# Patient Record
Sex: MALE | Race: WHITE | Employment: OTHER | URBAN - METROPOLITAN AREA
[De-identification: names, ages, dates, MRNs, and addresses within clinical notes are randomized per-mention and may not be internally consistent; named-entity substitution may affect disease eponyms.]

---

## 2017-04-20 ENCOUNTER — TELEPHONE (OUTPATIENT)
Dept: CARDIOLOGY CLINIC | Age: 72
End: 2017-04-20

## 2017-07-17 ENCOUNTER — TELEPHONE (OUTPATIENT)
Dept: CARDIOLOGY CLINIC | Age: 72
End: 2017-07-17

## 2017-07-17 NOTE — TELEPHONE ENCOUNTER
[facility] obtaining the last office note for patient, as he is having cataract and glaucoma surgery on 7/27/17. I called and spoke with Ms. Shona Benavides this morning and informed her that we will need a signed records release sent over before we can release anything.   Her contact #s are:  Coty  [phone]             Fax  [phone]

## 2017-10-25 ENCOUNTER — OFFICE VISIT (OUTPATIENT)
Dept: CARDIOLOGY CLINIC | Age: 72
End: 2017-10-25

## 2017-10-25 VITALS
WEIGHT: 188 LBS | HEIGHT: 67 IN | DIASTOLIC BLOOD PRESSURE: 80 MMHG | OXYGEN SATURATION: 97 % | SYSTOLIC BLOOD PRESSURE: 110 MMHG | HEART RATE: 44 BPM | BODY MASS INDEX: 29.51 KG/M2

## 2017-10-25 DIAGNOSIS — E78.00 HYPERCHOLESTEROLEMIA: ICD-10-CM

## 2017-10-25 DIAGNOSIS — E11.9 TYPE 2 DIABETES MELLITUS WITHOUT COMPLICATION, WITHOUT LONG-TERM CURRENT USE OF INSULIN (HCC): ICD-10-CM

## 2017-10-25 DIAGNOSIS — R00.1 BRADYCARDIA: Primary | ICD-10-CM

## 2017-10-25 DIAGNOSIS — I82.509 CHRONIC DEEP VEIN THROMBOSIS (DVT) OF LOWER EXTREMITY, UNSPECIFIED LATERALITY, UNSPECIFIED VEIN (HCC): ICD-10-CM

## 2017-10-25 DIAGNOSIS — R07.89 ATYPICAL CHEST PAIN: ICD-10-CM

## 2017-10-25 NOTE — PROGRESS NOTES
1. Have you been to the ER, urgent care clinic since your last visit? Hospitalized since your last visit? No     2. Have you seen or consulted any other health care providers outside of the [address] [place] [address] since your last visit? Include any pap smears or colon screening.  No

## 2017-10-25 NOTE — MR AVS SNAPSHOT
Visit Information Date & Time Provider Department Dept. Phone Encounter #  
 10/25/2017  1:20 PM Bard Raad MD [facility] [id number]  
  
 3/29/2018  1:00 PM  
Any with Sudhir Phillips MD  
[facility] ([facility] Appt Note: 1 year fu  
 [address] 
[address] [facility] [address]  
[address] [facility] [address] Upcoming Health Maintenance Date Due HEMOGLOBIN A1C Q6M 1945 LIPID PANEL Q1 1944 FOOT EXAM Q1 5/18/1954 MICROALBUMIN Q1 5/18/1954 EYE EXAM RETINAL OR DILATED Q1 5/18/1954 DTaP/Tdap/Td series (1 - Tdap) 5/18/1965 FOBT Q 1 YEAR AGE 50-75 5/18/1994 ZOSTER VACCINE AGE 60> 3/18/2004 GLAUCOMA SCREENING Q2Y 5/18/2009 Pneumococcal 65+ Low/Medium Risk (1 of 2 - PCV13) 5/18/2009 MEDICARE YEARLY EXAM 5/18/2009 INFLUENZA AGE 9 TO ADULT 8/1/2017 Allergies as of 10/25/2017  Review Complete On: 3/31/2017 By: Sudhir Phillips MD  
 No Known Allergies Current Immunizations  Never Reviewed No immunizations on file. Not reviewed this visit You Were Diagnosed With   
  
 Codes Comments Chronic deep vein thrombosis (DVT) of lower extremity, unspecified laterality, unspecified vein (HCC)    -  Primary ICD-10-CM: R54.643 ICD-9-CM: 453.50 Hypercholesterolemia     ICD-10-CM: E78.00 ICD-9-CM: 272.0 Dyslipidemia     ICD-10-CM: E78.5 ICD-9-CM: 272.4 Vitals BP Pulse Height(growth percentile) Weight(growth percentile) SpO2 BMI  
 110/80 (!) 44 5' 7\" (1.702 m) 188 lb (85.3 kg) 97% 29.44 kg/m2 Smoking Status Never Smoker Vitals History BMI and BSA Data Body Mass Index Body Surface Area  
 29.44 kg/m 2 2.01 m 2 Preferred Pharmacy Pharmacy Name Phone [facility] [address] [facility] [phone] Your Updated Medication List  
  
   
This list is accurate as of: 10/25/17  2:15 PM.  Always use your most recent med list.  
  
  
  
  
 acetaminophen 500 mg tablet Commonly known as:  TYLENOL Take 1,000 mg by mouth every six (6) hours as needed for Pain. ELIQUIS 5 mg tablet Generic drug:  apixaban Take 5 mg by mouth two (2) times a day. FLOMAX 0.4 mg capsule Generic drug:  tamsulosin Take 0.4 mg by mouth daily. fluticasone 50 mcg/actuation nasal spray Commonly known as:  FLONASE  
1 [place] by Both Nostrils route daily. JANUVIA PO Take 100 mg by mouth daily. mirabegron ER 50 mg ER tablet Commonly known as:  MYRBETRIQ Take 1 Tab by mouth daily. MULTIVITAMIN PO Take 1 Tab by mouth daily. PRAVACHOL 20 mg tablet Generic drug:  pravastatin Take 20 mg by mouth nightly. TYLENOL PM PO Take 500 mg by mouth as needed ( pain and sleep aid as needed). We Performed the Following AMB POC EKG ROUTINE W/ 12 LEADS, INTER & REP [90696 CPT(R)] Introducing [facility] [place] [facility]! Rey Rausch introduces [employer] patient portal. Now you can access parts of your medical record, email your doctor's office, and request medication refills online. 1. In your internet browser, go to [email or web address] [email or web address] 2. Click on the First Time User? Click Here link in the Sign In box. You will see the New Member Sign Up page. 3. Enter your [employer] Access Code exactly as it appears below. You will not need to use this code after youve completed the sign-up process. If you do not sign up before the expiration date, you must request a new code. · [employer] Access Code: [email or web address] Expires: 1/23/2018  1:20 PM 
 
4. Enter the last four digits of your Social Security Number (xxxx) and Date of Birth (mm/dd/yyyy) as indicated and click Submit. You will be taken to the next sign-up page. 5. Create a [employer] ID. This will be your [employer] login ID and cannot be changed, so think of one that is secure and easy to remember. 6. Create a [employer] password. You can change your password at any time. 7. Enter your Password Reset Question and Answer. This can be used at a later time if you forget your password. 8. Enter your e-mail address. You will receive e-mail notification when new information is available in [address] 9. Click Sign Up. You can now view and download portions of your medical record. 10. Click the Download Summary menu link to download a portable copy of your medical information. If you have questions, please visit the Frequently Asked Questions section of the [employer] website. Remember, [employer] is NOT to be used for urgent needs. For medical emergencies, dial 911. Now available from your iPhone and Android! Please provide this summary of care documentation to your next provider. Your primary care clinician is listed as Shon Mohs. If you have any questions after today's visit, please call [phone].

## 2017-10-25 NOTE — PROGRESS NOTES
HISTORY OF PRESENT ILLNESS  Cortez Mendes is a 67 y.o. male. Slow Heart Rate   Associated symptoms include chest pain. Pertinent negatives include no abdominal pain, no headaches and no shortness of breath. Chest Pain (Angina)    Pertinent negatives include no abdominal pain, no claudication, no cough, no dizziness, no fever, no headaches, no nausea, no orthopnea, no palpitations, no PND, no shortness of breath and no vomiting. Patient presents for a scheduled followup visit. He has a past medical history significant for asymptomatic bradycardia with heart rates in the upper 30s to low 50s. He also has a history of a previous CVA with residual right-sided deficit, history of recurrent DVTs, status post IVC filter, on chronic oral anticoagulation therapy. He last underwent an echocardiogram in February 2011 which showed normal LV size and systolic function. Grade 1 diastolic dysfunction, no significant valvular heart disease. He also underwent a 24-hour Holter monitor which did not show any prolonged pauses or episodes of high-grade AV block. Patient remains on Eliquis for oral anticoagulation. He was last seen in the office a little over a year ago. Since last visit he describes a total of 2 episodes of left-sided chest discomfort both which have occurred at rest.  The first episode was about 3-4 months ago which occurred intermittently for several hours and then resolved. He had a second episode which occurred yesterday lasting approximately 30 minutes before subsiding. It occurred at rest while in bed last night and was not particularly worse with exertion. No associated shortness of breath, diaphoresis, nausea or vomiting. The patient recalls that he was out in the yard raking leaves yesterday and recalls doing some yard work prior to but not during his previous episode as well.     Past Medical History:   Diagnosis Date    Abnormal electrocardiogram     BPH (benign prostatic hyperplasia)     Bradycardia     Asymptomatic    Cardiac echocardiogram 02/14/2011    EF 60-65%. No RWMA. Gr 1 DDfx. Mild LAE. Atrial septal aneurysm. No R-L shunt. Mild MARLON.  Cardiac Holter monitor study 02/21/2011    Sinus rhythm, avg HR 48 bpm (range  bpm). Occasional single VE (bigeminy noted). Occasional SVT (single, pairs, multifocal). Brief irregular runs of SVT suggest A-fib.  Cardiac nuclear imaging test 06/02/2008    [facility], [place], [address]:  No perfusion abnormalities. EF 49%. Neg EKG on pharm stress test.      Cardiovascular LE arterial testing 12/21/2009    No evidence of disease gisel. 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 90 Tab 3    acetaminophen (TYLENOL) 500 mg tablet Take 1,000 mg by mouth every six (6) hours as needed for Pain.  fluticasone (FLONASE) 50 mcg/actuation nasal spray 1 [place] by Both Nostrils route daily.  ACETAMINOPHEN/DIPHENHYDRAMINE (TYLENOL PM PO) Take 500 mg by mouth as needed ( pain and sleep aid as needed).  apixaban (ELIQUIS) 5 mg tablet Take 5 mg by mouth two (2) times a day.  SITAGLIPTIN PHOSPHATE (JANUVIA PO) Take 100 mg by mouth daily.  pravastatin (PRAVACHOL) 20 mg tablet Take 20 mg by mouth nightly.  tamsulosin (FLOMAX) 0.4 mg capsule Take 0.4 mg by mouth daily.  MULTIVITAMIN PO Take 1 Tab by mouth daily.          No Known Allergies     Social History   Substance Use Topics    Smoking status: Never Smoker    Smokeless tobacco: Never Used    Alcohol use No      Comment: occasionally         Review of Systems   Constitutional: Negative for chills, fever and weight loss. HENT: Negative for nosebleeds. Eyes: Negative for blurred vision and double vision. Respiratory: Negative for cough, shortness of breath and wheezing. Cardiovascular: Positive for chest pain and leg swelling. Negative for palpitations, orthopnea, claudication and PND. Gastrointestinal: Negative for abdominal pain, heartburn, nausea and vomiting. Genitourinary: Negative for dysuria and hematuria. Musculoskeletal: Negative for falls and myalgias. Skin: Negative for rash. Neurological: Negative for dizziness, focal weakness and headaches. Endo/Heme/Allergies: Does not bruise/bleed easily. Psychiatric/Behavioral: Negative for substance abuse. Visit Vitals    /80    Pulse (!) 44    Ht 5' 7\" (1.702 m)    Wt 85.3 kg (188 lb)    SpO2 97%    BMI 29.44 kg/m2      Physical Exam   Constitutional: He is oriented to person, place, and time. He appears well-developed and well-nourished. HENT:   Head: Normocephalic and atraumatic. Eyes: Conjunctivae are normal.   Neck: Neck supple. No JVD present. Carotid bruit is not present. Cardiovascular: Regular rhythm, S1 normal, S2 normal and normal pulses. Bradycardia present. Exam reveals no gallop and no S3. No murmur heard. Pulmonary/Chest: Breath sounds normal. He has no wheezes. He has no rales. Abdominal: Soft. Bowel sounds are normal. There is no tenderness. Musculoskeletal: He exhibits edema (Left lower extremity). Neurological: He is alert and oriented to person, place, and time. Skin: Skin is warm and dry. EKG: Sinus bradycardia, left anterior fascicular block, nonspecific IVCD, Poor R-wave progression, no ST or T wave abnormalities. Compared to the previous EKG, no significant interval change    ASSESSMENT and PLAN    Asymptomatic bradycardia. The patient continues to have a low resting heart rate in the 40s to 50s. He still appears relatively asymptomatic.   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 Atypical chest pain. I suspect this is musculoskeletal in nature as it was somewhat reproducible on exam today. No further cardiac workup needed unless this becomes more frequent and has an exertional component. Dyslipidemia. Patient is now managed with pravastatin 20 mg daily. This is been followed by his PCP. Diabetes mellitus, type II on oral agents also been managed by his primary care. Goal A1c should be less than 7. Recurrent DVTs, now on Eliquis for anticoagulation and does have a IVC filter in place. History of CVA, with residual right-sided deficit. He is now on a statin and Eliquis for secondary prevention. Followup annually, sooner if needed.

## 2019-02-06 ENCOUNTER — OFFICE VISIT (OUTPATIENT)
Dept: CARDIOLOGY CLINIC | Age: 74
End: 2019-02-06

## 2019-02-06 VITALS
DIASTOLIC BLOOD PRESSURE: 70 MMHG | OXYGEN SATURATION: 95 % | WEIGHT: 202 LBS | HEIGHT: 67 IN | BODY MASS INDEX: 31.71 KG/M2 | HEART RATE: 42 BPM | SYSTOLIC BLOOD PRESSURE: 122 MMHG

## 2019-02-06 DIAGNOSIS — R06.02 SOB (SHORTNESS OF BREATH): Primary | ICD-10-CM

## 2019-02-06 DIAGNOSIS — E78.00 HYPERCHOLESTEROLEMIA: ICD-10-CM

## 2019-02-06 DIAGNOSIS — R00.1 BRADYCARDIA: ICD-10-CM

## 2019-02-06 DIAGNOSIS — I82.509 CHRONIC DEEP VEIN THROMBOSIS (DVT) OF LOWER EXTREMITY, UNSPECIFIED LATERALITY, UNSPECIFIED VEIN (HCC): ICD-10-CM

## 2019-02-06 DIAGNOSIS — R94.31 ABNORMAL ELECTROCARDIOGRAM: ICD-10-CM

## 2019-02-06 NOTE — PROGRESS NOTES
HISTORY OF PRESENT ILLNESS Dante Walter is a 75 y.o. male. Slow Heart Rate Associated symptoms include shortness of breath. Follow-up Associated symptoms include shortness of breath. Shortness of Breath Associated symptoms include leg swelling. Pertinent negatives include no wheezing and no rash. Patient presents for a scheduled followup visit. He has a past medical history significant for asymptomatic bradycardia with heart rates in the upper 30s to low 50s. He also has a history of a previous CVA with residual right-sided deficit, history of recurrent DVTs, status post IVC filter, on chronic oral anticoagulation therapy. He last underwent an echocardiogram in February 2011 which showed normal LV size and systolic function. Grade 1 diastolic dysfunction, no significant valvular heart disease. He also underwent a 24-hour Holter monitor which did not show any prolonged pauses or episodes of high-grade AV block. Patient remains on Eliquis for oral anticoagulation. He was last seen in the office over a year ago. He recently went to visit some family, where he started eating fairly unhealthy foods and gained over 10 pounds in weight. He has noticed worsening shortness of breath especially when laying down over the past 2-3 weeks. He continues have chronic left lower extremity swelling but this has not significant change over the past year. No swelling on his right leg. He denies any exertional chest pain or pressure. No heart palpitations, no dizziness, no syncope or near syncope. Past Medical History:  
Diagnosis Date  Abnormal electrocardiogram   
 BPH (benign prostatic hyperplasia)  Bradycardia Asymptomatic  Cardiac echocardiogram 02/14/2011 EF 60-65%. No RWMA. Gr 1 DDfx. Mild LAE. Atrial septal aneurysm. No R-L shunt. Mild MARLON.  Cardiac Holter monitor study 02/21/2011 Sinus rhythm, avg HR 48 bpm (range  bpm).   Occasional single VE (bigeminy noted). Occasional SVT (single, pairs, multifocal). Brief irregular runs of SVT suggest A-fib.  Cardiac nuclear imaging test 06/02/2008 [address]:  No perfusion abnormalities. EF 49%. Neg EKG on pharm stress test.    
 Cardiovascular LE arterial testing 12/21/2009 No evidence of disease gisel. 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  ACETAMINOPHEN/DIPHENHYDRAMINE (TYLENOL PM PO) Take 500 mg by mouth as needed ( pain and sleep aid as needed).  apixaban (ELIQUIS) 5 mg tablet Take 5 mg by mouth two (2) times a day.  SITAGLIPTIN PHOSPHATE (JANUVIA PO) Take 100 mg by mouth daily.  pravastatin (PRAVACHOL) 20 mg tablet Take 20 mg by mouth nightly.  tamsulosin (FLOMAX) 0.4 mg capsule Take 0.4 mg by mouth daily.  MULTIVITAMIN PO Take 1 Tab by mouth daily. No Known Allergies Social History Tobacco Use  Smoking status: Never Smoker  Smokeless tobacco: Never Used Substance Use Topics  Alcohol use: No  
  Comment: occasionally  Drug use: No  
 
 
 
Review of Systems Constitutional: Negative for chills and weight loss. HENT: Negative for nosebleeds. Eyes: Negative for blurred vision and double vision. Respiratory: Positive for shortness of breath. Negative for wheezing. Cardiovascular: Positive for leg swelling. Gastrointestinal: Negative for heartburn. Genitourinary: Negative for dysuria and hematuria. Musculoskeletal: Negative for falls and myalgias. Skin: Negative for rash. Neurological: Negative for focal weakness. Endo/Heme/Allergies: Does not bruise/bleed easily. Psychiatric/Behavioral: Negative for substance abuse. Visit Vitals /70 Pulse (!) 42 Ht 5' 7\" (1.702 m) Wt 91.6 kg (202 lb) SpO2 95% BMI 31.64 kg/m² Physical Exam  
Constitutional: He is oriented to person, place, and time. He appears well-developed and well-nourished. HENT:  
Head: Normocephalic and atraumatic. Eyes: Conjunctivae are normal.  
Neck: Neck supple. No JVD present. Carotid bruit is not present. Cardiovascular: Regular rhythm, S1 normal, S2 normal and normal pulses. Bradycardia present. Exam reveals no gallop and no S3. No murmur heard. Pulmonary/Chest: Breath sounds normal. He has no wheezes. He has no rales. Abdominal: Soft. Bowel sounds are normal. There is no tenderness. Musculoskeletal: He exhibits edema (Left lower extremity). Neurological: He is alert and oriented to person, place, and time. Skin: Skin is warm and dry. EKG: Sinus bradycardia, left anterior fascicular block, nonspecific IVCD, Poor R-wave progression, no ST or T wave abnormalities. Compared to the previous EKG, no significant interval change ASSESSMENT and PLAN Shortness of breath. This sounds like orthopnea. His weight is up over 10 pounds from last visit. I have recommended an echocardiogram to reevaluate his LV function. If his symptoms persist, he may benefit from the addition of a loop diuretic. Asymptomatic bradycardia. The patient continues to have a low resting heart rate in the 40s to 50s. He still appears relatively asymptomatic. 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 Dyslipidemia. Patient is now managed with pravastatin 20 mg daily. This is been followed by his PCP. Diabetes mellitus, type II on oral agents also been managed by his primary care.  Goal A1c should be less than 7. Recurrent DVTs, now on Eliquis for anticoagulation and does have a IVC filter in place. History of CVA, with residual right-sided deficit. He is now on a statin and Eliquis for secondary prevention. As long as his echocardiogram is unremarkable, patient can continue to follow-up annually. Sooner if needed.

## 2019-03-27 ENCOUNTER — HOSPITAL ENCOUNTER (OUTPATIENT)
Dept: NON INVASIVE DIAGNOSTICS | Age: 74
Discharge: HOME | End: 2019-03-27
Attending: INTERNAL MEDICINE
Payer: MEDICARE

## 2019-03-27 VITALS
SYSTOLIC BLOOD PRESSURE: 122 MMHG | WEIGHT: 202 LBS | DIASTOLIC BLOOD PRESSURE: 70 MMHG | HEIGHT: 67 IN | BODY MASS INDEX: 31.71 KG/M2

## 2019-03-27 DIAGNOSIS — R06.02 SOB (SHORTNESS OF BREATH): ICD-10-CM

## 2019-03-27 LAB
ECHO AO ASC DIAM: 4.16 CM
ECHO AO ROOT DIAM: 4.03 CM
ECHO LA AREA 4C: 19.8 CM2
ECHO LA VOL 2C: 29.54 ML (ref 18–58)
ECHO LA VOL 4C: 53.1 ML (ref 18–58)
ECHO LA VOL BP: 53.43 ML (ref 18–58)
ECHO LA VOL/BSA BIPLANE: 26.31 ML/M2 (ref 16–28)
ECHO LA VOLUME INDEX A2C: 14.55 ML/M2 (ref 16–28)
ECHO LA VOLUME INDEX A4C: 26.15 ML/M2 (ref 16–28)
ECHO LV E' LATERAL VELOCITY: 4.83 CM/S
ECHO LV E' SEPTAL VELOCITY: 4.02 CM/S
ECHO LV INTERNAL DIMENSION DIASTOLIC: 3.88 CM (ref 4.2–5.9)
ECHO LV INTERNAL DIMENSION SYSTOLIC: 2.74 CM
ECHO LV IVSD: 1.39 CM (ref 0.6–1)
ECHO LV MASS 2D: 219.5 G (ref 88–224)
ECHO LV MASS INDEX 2D: 108.1 G/M2 (ref 49–115)
ECHO LV POSTERIOR WALL DIASTOLIC: 1.29 CM (ref 0.6–1)
ECHO LVOT DIAM: 2.42 CM
ECHO LVOT PEAK GRADIENT: 1.7 MMHG
ECHO LVOT PEAK VELOCITY: 65.44 CM/S
ECHO LVOT VTI: 13.58 CM
ECHO MV A VELOCITY: 51.95 CM/S
ECHO MV E DECELERATION TIME (DT): 358.3 MS
ECHO MV E VELOCITY: 46.77 CM/S
ECHO MV E/A RATIO: 0.9
ECHO MV E/E' LATERAL: 9.68
ECHO MV E/E' RATIO (AVERAGED): 10.66
ECHO MV E/E' SEPTAL: 11.63
ECHO PULMONARY ARTERY SYSTOLIC PRESSURE (PASP): 26 MMHG
ECHO RV TAPSE: 1.58 CM (ref 1.5–2)
ECHO TV REGURGITANT MAX VELOCITY: 210.63 CM/S
ECHO TV REGURGITANT PEAK GRADIENT: 17.7 MMHG

## 2019-03-27 PROCEDURE — 93306 TTE W/DOPPLER COMPLETE: CPT

## 2019-03-27 NOTE — PROGRESS NOTES
Per your last note\" Shortness of breath. This sounds like orthopnea. His weight is up over 10 pounds from last visit. I have recommended an echocardiogram to reevaluate his LV function. If his symptoms persist, he may benefit from the addition of a loop diuretic.

## 2019-03-28 ENCOUNTER — TELEPHONE (OUTPATIENT)
Dept: CARDIOLOGY CLINIC | Age: 74
End: 2019-03-28

## 2019-03-28 NOTE — TELEPHONE ENCOUNTER
Verified patient name and , with patient daughter Allyson Taylor) verified consent in the chart. Results have been fully explained to the patient's daughter, who indicates understanding.

## 2019-03-28 NOTE — TELEPHONE ENCOUNTER
----- Message from Eh Thurston MD sent at 3/27/2019  4:04 PM EDT ----- Please let the patient know that his heart function was normal on echocardiogram. 
----- Message ----- From: Tara Arias LPN Sent: 3/27/2019   1:16 PM 
To: Eh Thurston MD 
 
Per your last note\" Shortness of breath. This sounds like orthopnea. His weight is up over 10 pounds from last visit. I have recommended an echocardiogram to reevaluate his LV function. If his symptoms persist, he may benefit from the addition of a loop diuretic.

## 2019-05-07 ENCOUNTER — HOSPITAL ENCOUNTER (OUTPATIENT)
Age: 74
Setting detail: OUTPATIENT SURGERY
Discharge: HOME | End: 2019-05-07
Attending: INTERNAL MEDICINE | Admitting: INTERNAL MEDICINE
Payer: MEDICARE

## 2019-05-07 VITALS
RESPIRATION RATE: 27 BRPM | HEIGHT: 67 IN | OXYGEN SATURATION: 97 % | HEART RATE: 49 BPM | WEIGHT: 197 LBS | DIASTOLIC BLOOD PRESSURE: 76 MMHG | BODY MASS INDEX: 30.92 KG/M2 | SYSTOLIC BLOOD PRESSURE: 124 MMHG | TEMPERATURE: 97.8 F

## 2019-05-07 LAB — GLUCOSE BLD STRIP.AUTO-MCNC: 162 MG/DL (ref 70–110)

## 2019-05-07 PROCEDURE — 82962 GLUCOSE BLOOD TEST: CPT

## 2019-05-07 PROCEDURE — 76040000019: Performed by: INTERNAL MEDICINE

## 2019-05-07 PROCEDURE — 77030037006 HC FCPS BIOP ENDOSC DISP OCOA -A: Performed by: INTERNAL MEDICINE

## 2019-05-07 PROCEDURE — G0500 MOD SEDAT ENDO SERVICE >5YRS: HCPCS | Performed by: INTERNAL MEDICINE

## 2019-05-07 PROCEDURE — 88305 TISSUE EXAM BY PATHOLOGIST: CPT

## 2019-05-07 PROCEDURE — 74011250636 HC RX REV CODE- 250/636: Performed by: INTERNAL MEDICINE

## 2019-05-07 RX ORDER — DEXTROMETHORPHAN/PSEUDOEPHED 2.5-7.5/.8
1.2 DROPS ORAL
Status: DISCONTINUED | OUTPATIENT
Start: 2019-05-07 | End: 2019-05-07 | Stop reason: HOSPADM

## 2019-05-07 RX ORDER — FENTANYL CITRATE 50 UG/ML
25-200 INJECTION, SOLUTION INTRAMUSCULAR; INTRAVENOUS
Status: DISCONTINUED | OUTPATIENT
Start: 2019-05-07 | End: 2019-05-07 | Stop reason: HOSPADM

## 2019-05-07 RX ORDER — EPINEPHRINE 0.1 MG/ML
1 INJECTION INTRACARDIAC; INTRAVENOUS
Status: DISCONTINUED | OUTPATIENT
Start: 2019-05-07 | End: 2019-05-07 | Stop reason: HOSPADM

## 2019-05-07 RX ORDER — SODIUM CHLORIDE 9 MG/ML
25 INJECTION, SOLUTION INTRAVENOUS CONTINUOUS
Status: DISCONTINUED | OUTPATIENT
Start: 2019-05-07 | End: 2019-05-07 | Stop reason: HOSPADM

## 2019-05-07 RX ORDER — ATROPINE SULFATE 0.1 MG/ML
0.5 INJECTION INTRAVENOUS
Status: DISCONTINUED | OUTPATIENT
Start: 2019-05-07 | End: 2019-05-07 | Stop reason: HOSPADM

## 2019-05-07 RX ORDER — MIDAZOLAM HYDROCHLORIDE 1 MG/ML
.25-6 INJECTION, SOLUTION INTRAMUSCULAR; INTRAVENOUS
Status: DISCONTINUED | OUTPATIENT
Start: 2019-05-07 | End: 2019-05-07 | Stop reason: HOSPADM

## 2019-05-07 RX ORDER — FLUMAZENIL 0.1 MG/ML
0.2 INJECTION INTRAVENOUS
Status: DISCONTINUED | OUTPATIENT
Start: 2019-05-07 | End: 2019-05-07 | Stop reason: HOSPADM

## 2019-05-07 RX ORDER — NALOXONE HYDROCHLORIDE 0.4 MG/ML
0.4 INJECTION, SOLUTION INTRAMUSCULAR; INTRAVENOUS; SUBCUTANEOUS
Status: DISCONTINUED | OUTPATIENT
Start: 2019-05-07 | End: 2019-05-07 | Stop reason: HOSPADM

## 2019-05-07 RX ORDER — SODIUM CHLORIDE 0.9 % (FLUSH) 0.9 %
5-40 SYRINGE (ML) INJECTION EVERY 8 HOURS
Status: DISCONTINUED | OUTPATIENT
Start: 2019-05-07 | End: 2019-05-07 | Stop reason: HOSPADM

## 2019-05-07 RX ORDER — SODIUM CHLORIDE 0.9 % (FLUSH) 0.9 %
5-40 SYRINGE (ML) INJECTION AS NEEDED
Status: DISCONTINUED | OUTPATIENT
Start: 2019-05-07 | End: 2019-05-07 | Stop reason: HOSPADM

## 2019-05-07 RX ADMIN — SODIUM CHLORIDE 25 ML/HR: 900 INJECTION, SOLUTION INTRAVENOUS at 08:00

## 2019-05-07 NOTE — DISCHARGE INSTRUCTIONS
Patient Education        Diverticulosis: Care Instructions  Your Care Instructions  In diverticulosis, pouches called diverticula form in the wall of the large intestine (colon). The pouches do not cause any pain or other symptoms. Most people who have diverticulosis do not know they have it. But the pouches sometimes bleed, and if they become infected, they can cause pain and other symptoms. When this happens, it is called diverticulitis. Diverticula form when pressure pushes the wall of the colon outward at certain weak points. A diet that is too low in fiber can cause diverticula. Follow-up care is a key part of your treatment and safety. Be sure to make and go to all appointments, and call your doctor if you are having problems. It's also a good idea to know your test results and keep a list of the medicines you take. How can you care for yourself at home? · Include fruits, leafy green vegetables, beans, and whole grains in your diet each day. These foods are high in fiber. · Take a fiber supplement, such as Citrucel or Metamucil, every day if needed. Read and follow all instructions on the label. · Drink plenty of fluids, enough so that your urine is light yellow or clear like water. If you have kidney, heart, or liver disease and have to limit fluids, talk with your doctor before you increase the amount of fluids you drink. · Get at least 30 minutes of exercise on most days of the week. Walking is a good choice. You also may want to do other activities, such as running, swimming, cycling, or playing tennis or team sports. · Cut out foods that cause gas, pain, or other symptoms. When should you call for help?   Call your doctor now or seek immediate medical care if:    · You have belly pain.     · You pass maroon or very bloody stools.     · You have a fever.     · You have nausea and vomiting.     · You have unusual changes in your bowel movements or abdominal swelling.     · You have burning pain when you urinate.     · You have abnormal vaginal discharge.     · You have shoulder pain.     · You have cramping pain that does not get better when you have a bowel movement or pass gas.     · You pass gas or stool from your urethra while urinating.    Watch closely for changes in your health, and be sure to contact your doctor if you have any problems. Where can you learn more? Go to http://shayy-adi.info/. Enter E068 in the search box to learn more about \"Diverticulosis: Care Instructions. \"  Current as of: March 27, 2018  Content Version: 11.9  © 8882-6152 [employer]. Care instructions adapted under license by [employer] (which disclaims liability or warranty for this information). If you have questions about a medical condition or this instruction, always ask your healthcare professional. [address] any warranty or liability for your use of this information. Colonoscopy: What to Expect at [address] [place] [address]  After you have a colonoscopy, you will stay at the clinic for 1 to 2 hours until the medicines wear off. Then you can go home. But you will need to arrange for a ride. Your doctor will tell you when you can eat and do your other usual activities. Your doctor will talk to you about when you will need your next colonoscopy. Your doctor can help you decide how often you need to be checked. This will depend on the results of your test and your risk for colorectal cancer. After the test, you may be bloated or have gas pains. You may need to pass gas. If a biopsy was done or a polyp was removed, you may have streaks of blood in your stool (feces) for a few days. This care sheet gives you a general idea about how long it will take for you to recover. But each person recovers at a different pace. Follow the steps below to get better as quickly as possible. How can you care for yourself at home?   Activity  · Rest when you feel tired.  · You can do your normal activities when it feels okay to do so. Diet  · Follow your doctor's directions for eating. · Unless your doctor has told you not to, drink plenty of fluids. This helps to replace the fluids that were lost during the colon prep. · Do not drink alcohol. Medicines  · If polyps were removed or a biopsy was done during the test, your doctor may tell you not to take aspirin or other anti-inflammatory medicines for a few days. These include ibuprofen (Advil, Motrin) and naproxen (Aleve). Other instructions  · For your safety, do not drive or operate machinery until the medicine wears off and you can think clearly. Your doctor may tell you not to drive or operate machinery until the day after your test.  · Do not sign legal documents or make major decisions until the medicine wears off and you can think clearly. The anesthesia can make it hard for you to fully understand what you are agreeing to. Follow-up care is a key part of your treatment and safety. Be sure to make and go to all appointments, and call your doctor if you are having problems. It's also a good idea to know your test results and keep a list of the medicines you take. When should you call for help? Call 911 anytime you think you may need emergency care. For example, call if:  · You passed out (lost consciousness). · You pass maroon or bloody stools. · You have severe belly pain. Call your doctor now or seek immediate medical care if:  · Your stools are black and tarlike. · Your stools have streaks of blood, but you did not have a biopsy or any polyps removed. · You have belly pain, or your belly is swollen and firm. · You vomit. · You have a fever. · You are very dizzy. Watch closely for changes in your health, and be sure to contact your doctor if you have any problems. Where can you learn more?    Go to [employer]  Enter E264 in the search box to learn more about \"Colonoscopy: What to Expect at Home. \"   © 3667-5358 [employer]. Care instructions adapted under license by [employer] (which disclaims liability or warranty for this information). This care instruction is for use with your licensed healthcare professional. If you have questions about a medical condition or this instruction, always ask your healthcare professional. [address]  any warranty or liability for your use of this information. Content Version: 91.2.996177; Current as of: November 14, 2014      DISCHARGE SUMMARY from Nurse     POST-PROCEDURE INSTRUCTIONS:    Call your Physician if you:  ? Observe any excess bleeding. ? Develop a temperature over 100.5o F.  ? Experience abdominal, shoulder or chest pain. ? Notice any signs of decreased circulation or nerve impairment to an extremity such as a change in color, persistent numbness, tingling, coldness or increase in pain. ? Vomit blood or you have nausea and vomiting lasting longer than 4 hours. ? Are unable to take medications. ? Are unable to urinate within 8 hours after discharge following general anesthesia or intravenous sedation. For the next 24 hours after receiving general anesthesia or intravenous sedation, or while taking prescription Narcotics, limit your activities:  ? Do NOT drive a motor vehicle, operate hazard machinery or power tools, or perform tasks that require coordination. The medication you received during your procedure may have some effect on your mental awareness. ? Do NOT make important personal or business decisions. The medication you received during your procedure may have some effect on your mental awareness. ? Do NOT drink alcoholic beverages. These drinks do not mix well with the medications that have been given to you. ? Upon discharge from the hospital, you must be accompanied by a responsible adult. ? Resume your diet as directed by your physician. ?  Resume medications as your physician has prescribed. ? Please give a list of your current medications to your Primary Care Provider. ? Please update this list whenever your medications are discontinued, doses are changed, or new medications (including over-the-counter products) are added. ? Please carry medication information at all times in case of emergency situations. These are general instructions for a healthy lifestyle:    No smoking/ No tobacco products/ Avoid exposure to second hand smoke.  Surgeon General's Warning:  Quitting smoking now greatly reduces serious risk to your health. Obesity, smoking, and a sedentary lifestyle greatly increase your risk for illness.  A healthy diet, regular physical exercise & weight monitoring are important for maintaining a healthy lifestyle   You may be retaining fluid if you have a history of heart failure or if you experience any of the following symptoms:  Weight gain of 3 pounds or more overnight or 5 pounds in a week, increased swelling in our hands or feet or shortness of breath while lying flat in bed. Please call your doctor as soon as you notice any of these symptoms; do not wait until your next office visit. Recognize signs and symptoms of STROKE:  F  -  Face looks uneven  A  -  Arms unable to move or move unevenly  S  -  Speech slurred or non-existent  T  -  Time to call 911 - as soon as signs and symptoms begin - DO NOT go back to bed or wait to see If you get better - TIME IS BRAIN. Colorectal Screening   Colorectal cancer almost always develops from precancerous polyps (abnormal growths) in the colon or rectum. Screening tests can find precancerous polyps, so that they can be removed before they turn into cancer. Screening tests can also find colorectal cancer early, when treatment works best.  Barry Anastasia Speak with your physician about when you should begin screening and how often you should be tested.     Additional Information    If you have questions, please call [phone]. Remember, MyChart is NOT to be used for urgent needs. For medical emergencies, dial 911. Educational references and/or instructions provided during this visit included:    Colon Polyps and Diverticulosis      Discharge information has been reviewed with the patient. The patientPatient Education        Colon Polyps: Care Instructions  Your Care Instructions    Colon polyps are growths in the colon or the rectum. The cause of most colon polyps is not known, and most people who get them do not have any problems. But a certain kind can turn into cancer. For this reason, regular testing for colon polyps is important for people age 48 and older and anyone who has an increased risk for colon cancer. Polyps are usually found through routine colon cancer screening tests. Although most colon polyps are not cancerous, they are usually removed and then tested for cancer. Screening for colon cancer saves lives because the cancer can usually be cured if it is caught early. If you have a polyp that is the type that can turn into cancer, you may need more tests to examine your entire colon. The doctor will remove any other polyps that he or she finds, and you will be tested more often. Follow-up care is a key part of your treatment and safety. Be sure to make and go to all appointments, and call your doctor if you are having problems. It's also a good idea to know your test results and keep a list of the medicines you take. How can you care for yourself at home? Regular exams to look for colon polyps are the best way to prevent polyps from turning into colon cancer. These can include stool tests, sigmoidoscopy, colonoscopy, and CT colonography. Talk with your doctor about a testing schedule that is right for you. To prevent polyps  There is no home treatment that can prevent colon polyps. But these steps may help lower your risk for cancer. · Stay active.  Being active can help you get to and stay at a healthy weight. Try to exercise on most days of the week. Walking is a good choice. · Eat well. Choose a variety of vegetables, fruits, legumes (such as peas and beans), fish, poultry, and whole grains. · Do not smoke. If you need help quitting, talk to your doctor about stop-smoking programs and medicines. These can increase your chances of quitting for good. · If you drink alcohol, limit how much you drink. Limit alcohol to 2 drinks a day for men and 1 drink a day for women. When should you call for help? Call your doctor now or seek immediate medical care if:    · You have severe belly pain.     · Your stools are maroon or very bloody.    Watch closely for changes in your health, and be sure to contact your doctor if:    · You have a fever.     · You have nausea or vomiting.     · You have a change in bowel habits (new constipation or diarrhea).     · Your symptoms get worse or are not improving as expected. Where can you learn more? Go to http://shayy-adi.info/. Enter 95 [id number] in the search box to learn more about \"Colon Polyps: Care Instructions. \"  Current as of: March 27, 2018  Content Version: 11.9  © 7838-3921 [employer]. Care instructions adapted under license by [employer] (which disclaims liability or warranty for this information). If you have questions about a medical condition or this instruction, always ask your healthcare professional. [place] [address] any warranty or liability for your use of this information. verbalized understanding.

## 2019-05-07 NOTE — PROCEDURES
Thiago  [facility], [address]      Brief Procedure Note    Markus Troy  [birth year]  [id number]    Date of Procedure: 5/7/2019    Preoperative diagnosis: V12.72 - Z86.010,  Personal history of colon polyps  564.09 - K59.00,  Constipation, chronic    Postoperative diagnosis:  Colon Polyp, diverticulosis    Type of Anesthesia: IVC    Description of Findings: same as post op dx    Procedure: Procedure(s):  COLONOSCOPY with polypectomy    :  Dr. Lio Hoffman MD    Assistant(s): @[facility]@    Type of Anesthesia:MAC     EBL:None    Specimens:   ID Type Source Tests Collected by Time Destination   1 : Transverse polylp Preservative Colon, Ary MD ePrri 5/7/2019 3328 Pathology       Findings: See printed and scanned procedure note    Complications: None    Dr. Lio Hoffman MD  5/7/2019  8:30 AM

## 2019-05-07 NOTE — H&P
[facility] 1948, [place] [email or web address] Impression: 1. Hx of colon polyps. / 
 
 
Plan: 1. [place] with IVC Chief Complaint: Hx of polyps. HPI: 
Cortez Mendes is a 75 y.o. male who is being seen preop for hx of colon polyps. PMH:  
Past Medical History:  
Diagnosis Date  Abnormal electrocardiogram   
 BPH (benign prostatic hyperplasia)  Bradycardia Asymptomatic  Cardiac echocardiogram 02/14/2011 EF 60-65%. No RWMA. Gr 1 DDfx. Mild LAE. Atrial septal aneurysm. No R-L shunt. Mild MARLON.  Cardiac Holter monitor study 02/21/2011 Sinus rhythm, avg HR 48 bpm (range  bpm). Occasional single VE (bigeminy noted). Occasional SVT (single, pairs, multifocal). Brief irregular runs of SVT suggest A-fib.  Cardiac nuclear imaging test 06/02/2008 [address]:  No perfusion abnormalities. EF 49%. Neg EKG on pharm stress test.    
 Cardiovascular LE arterial testing 12/21/2009 No evidence of disease gisel. 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   
 no cpap  Stroke ([address] 2000 PSH:  
Past Surgical History:  
Procedure Laterality Date  HX APPENDECTOMY  HX CARPAL TUNNEL RELEASE    
 both hands  HX FEMUR FRACTURE [place]  2016  HX HEMORRHOIDECTOMY  HX HERNIA REPAIR    
 HX ORTHOPAEDIC    
 shoulder, hip, and both knees  HX TONSILLECTOMY Social HX:  
Social History Socioeconomic History  Marital status:  Spouse name: Not on file  Number of children: Not on file  Years of education: Not on file  Highest education level: Not on file Occupational History  Not on file Social Needs  Financial resource strain: Not on file  Food insecurity:  
  Worry: Not on file Inability: Not on file  Transportation needs:  
  Medical: Not on file Non-medical: Not on file Tobacco Use  Smoking status: Never Smoker  Smokeless tobacco: Never Used Substance and Sexual Activity  Alcohol use: No  
  Comment: occasionally  Drug use: No  
 Sexual activity: Not on file Lifestyle  Physical activity:  
  Days per week: Not on file Minutes per session: Not on file  Stress: Not on file Relationships  Social connections:  
  Talks on phone: Not on file Gets together: Not on file Attends Holiness service: Not on file Active member of club or organization: Not on file Attends meetings of clubs or organizations: Not on file Relationship status: Not on file  Intimate partner violence:  
  Fear of current or ex partner: Not on file Emotionally abused: Not on file Physically abused: Not on file Forced sexual activity: Not on file Other Topics Concern  Not on file Social History Narrative  Not on file FHX:  
Family History Problem Relation Age of Onset  Cancer Mother  Heart Disease Father Allergy: No Known Allergies Home Medications:  
 
Medications Prior to Admission Medication Sig  
 fluticasone (FLONASE) 50 mcg/actuation nasal spray 1 [place] by Both Nostrils route daily.  ACETAMINOPHEN/DIPHENHYDRAMINE (TYLENOL PM PO) Take 500 mg by mouth as needed ( pain and sleep aid as needed).  apixaban (ELIQUIS) 5 mg tablet Take 5 mg by mouth two (2) times a day.  SITAGLIPTIN PHOSPHATE (JANUVIA PO) Take 100 mg by mouth daily.  pravastatin (PRAVACHOL) 20 mg tablet Take 20 mg by mouth nightly.  tamsulosin (FLOMAX) 0.4 mg capsule Take 0.4 mg by mouth daily.  MULTIVITAMIN PO Take 1 Tab by mouth daily. Review of Systems:  
 
Constitutional: No fevers, chills, weight loss, fatigue. Cardiovascular: No chest pain, heart palpitations. Respiratory: No cough, SOB, wheezing, chest discomfort, orthopnea. Gastrointestinal: No GI or oabdominal complaints Musculoskeletal: No weakness, arthralgias, wasting. Allergies: As noted. Visit Vitals Ht 5' 6\" (1.676 m) Wt 89.4 kg (197 lb) BMI 31.80 kg/m² Physical Assessment:  
 
constitutional: appearance: well developed, well nourished, normal habitus, no deformities, in no acute distress. ENMT: mouth: normal oral mucosa,lips and gums; good dentition. oropharynx: normal tongue, hard and soft palate; posterior pharynx without erithema, exudate or lesions. respiratory: effort: normal chest excursion; no intercostal retraction or accessory muscle use. cardiovascular: abdominal aorta: normal size and position; no bruits. palpation: PMI of normal size and position; normal rhythm; no thrill or murmurs. abdominal: abdomen: normal consistency; no tenderness or masses. hernias: no hernias appreciated. liver: normal size and consistency. spleen: not palpable. rectal: hemoccult/guaiac: not performed. musculoskeletal: digits and nails: no clubbing, cyanosis, petechiae or other inflammatory conditions. psychiatric: orientation: oriented to time, space and person. Christina Brown MD, M.D. [facility] 1948, [address] [facility] Pager [phone] 
[email or web address]

## 2021-03-30 ENCOUNTER — OFFICE VISIT (OUTPATIENT)
Dept: CARDIOLOGY CLINIC | Age: 76
End: 2021-03-30
Payer: MEDICARE

## 2021-03-30 VITALS
OXYGEN SATURATION: 99 % | HEIGHT: 67 IN | BODY MASS INDEX: 30.13 KG/M2 | DIASTOLIC BLOOD PRESSURE: 80 MMHG | SYSTOLIC BLOOD PRESSURE: 132 MMHG | WEIGHT: 192 LBS | HEART RATE: 50 BPM

## 2021-03-30 DIAGNOSIS — E78.00 HYPERCHOLESTEROLEMIA: ICD-10-CM

## 2021-03-30 DIAGNOSIS — I82.509 CHRONIC DEEP VEIN THROMBOSIS (DVT) OF LOWER EXTREMITY, UNSPECIFIED LATERALITY, UNSPECIFIED VEIN (HCC): ICD-10-CM

## 2021-03-30 DIAGNOSIS — R42 DIZZINESS: ICD-10-CM

## 2021-03-30 DIAGNOSIS — R94.31 ABNORMAL ELECTROCARDIOGRAM: ICD-10-CM

## 2021-03-30 DIAGNOSIS — R00.1 BRADYCARDIA: Primary | ICD-10-CM

## 2021-03-30 DIAGNOSIS — R00.1 BRADYCARDIA: ICD-10-CM

## 2021-03-30 PROCEDURE — G8510 SCR DEP NEG, NO PLAN REQD: HCPCS | Performed by: INTERNAL MEDICINE

## 2021-03-30 PROCEDURE — G8536 NO DOC ELDER MAL SCRN: HCPCS | Performed by: INTERNAL MEDICINE

## 2021-03-30 PROCEDURE — G8417 CALC BMI ABV UP PARAM F/U: HCPCS | Performed by: INTERNAL MEDICINE

## 2021-03-30 PROCEDURE — 99214 OFFICE O/P EST MOD 30 MIN: CPT | Performed by: INTERNAL MEDICINE

## 2021-03-30 PROCEDURE — 93000 ELECTROCARDIOGRAM COMPLETE: CPT | Performed by: INTERNAL MEDICINE

## 2021-03-30 PROCEDURE — G8427 DOCREV CUR MEDS BY ELIG CLIN: HCPCS | Performed by: INTERNAL MEDICINE

## 2021-03-30 PROCEDURE — 1101F PT FALLS ASSESS-DOCD LE1/YR: CPT | Performed by: INTERNAL MEDICINE

## 2021-03-30 RX ORDER — CALCIUM POLYCARBOPHIL 625 MG
625 TABLET ORAL DAILY
COMMUNITY

## 2021-03-30 RX ORDER — PIOGLITAZONEHYDROCHLORIDE 30 MG/1
30 TABLET ORAL 2 TIMES DAILY
COMMUNITY
Start: 2021-03-09

## 2021-03-30 NOTE — PROGRESS NOTES
Irma Rivas presents today for   Chief Complaint   Patient presents with    Follow-up     overdue follow up, has not been seen since 2019       Irma Rivas preferred language for health care discussion is english/other. Is someone accompanying this pt? yes    Is the patient using any DME equipment during [address]? walker    Depression Screening:  3 most recent PHQ Screens 3/30/2021   Little interest or pleasure in doing things Not at all   Feeling down, depressed, irritable, or hopeless Not at all   Total Score PHQ 2 0       Learning Assessment:  Learning Assessment 3/30/2021   PRIMARY LEARNER Patient   CO-LEARNER CAREGIVER -   PRIMARY LANGUAGE ENGLISH   LEARNER PREFERENCE PRIMARY DEMONSTRATION   ANSWERED BY patient   RELATIONSHIP SELF       Abuse Screening:  Abuse Screening Questionnaire 3/30/2021   Do you ever feel afraid of your partner? N   Are you in a relationship with someone who physically or mentally threatens you? N   Is it safe for you to go home? Y       Fall Risk  Fall Risk Assessment, last 12 mths 3/30/2021   Able to walk? Yes   Fall in past 12 months? 0   Do you feel unsteady? 0   Are you worried about falling 0       Pt currently taking Anticoagulant therapy? Eliquis 5 mg twice a day    Coordination of Care:  1. Have you been to the ER, urgent care clinic since your last visit? Hospitalized since your last visit? no    2. Have you seen or consulted any other health care providers outside of the [address] [place] [address] since your last visit? Include any pap smears or colon screening.  no

## 2021-03-30 NOTE — PROGRESS NOTES
HISTORY OF PRESENT ILLNESS  Jero Barajas is a 67 y.o. male. Follow-up  Pertinent negatives include no chest pain, no abdominal pain, no headaches and no shortness of breath. Slow Heart Rate  Pertinent negatives include no chest pain, no abdominal pain, no headaches and no shortness of breath. Patient presents for an overdue followup visit. He has a past medical history significant for asymptomatic bradycardia with heart rates in the upper 30s to low 50s. He also has a history of a previous CVA with residual right-sided deficit, history of recurrent DVTs, status post IVC filter, on chronic oral anticoagulation therapy. He last underwent an echocardiogram in February 2011 which showed normal LV size and systolic function. Grade 1 diastolic dysfunction, no significant valvular heart disease. He also underwent a 24-hour Holter monitor which did not show any prolonged pauses or episodes of high-grade AV block. Patient remains on Eliquis for oral anticoagulation. 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 The patient was last seen in our office a little over 2 years ago. He states he has been feeling very well up until recently when he noticed 2 separate dizzy spells about a month apart. He states his first spell was either in January or February of this year which lasted for the entire day. He thought it may be due to low blood sugar, but he states his blood glucose was normal.  More recently he had a second episode earlier this month which only lasted for the morning hours and then resolved. He states since that time his been feeling back to baseline. He denies any leslie syncope or near syncope. No heart palpitations, orthopnea or PND.   He continues to have chronic lower extremity swelling which has not significantly changed over the past few years.    Past Medical History:   Diagnosis Date   • Abnormal electrocardiogram    • BPH (benign prostatic hyperplasia)    • Bradycardia     Asymptomatic   • Cardiac echocardiogram 02/14/2011    EF 60-65%.  No RWMA.  Gr 1 DDfx.  Mild LAE.  Atrial septal aneurysm.  No R-L shunt.  Mild MARLON.   • Cardiac Holter monitor study 02/21/2011    Sinus rhythm, avg HR 48 bpm (range  bpm).  Occasional single VE (bigeminy noted).  Occasional SVT (single, pairs, multifocal).  Brief irregular runs of SVT suggest A-fib.   • Cardiac nuclear imaging test 06/02/2008    [place], [place]:  No perfusion abnormalities.  EF 49%.  Neg EKG on pharm stress test.     • Cardiovascular LE arterial testing 12/21/2009    No evidence of disease gisel.    • CVA (cerebral infarction) 2000   • Deep venous thrombosis (HCC) 10/22/2010    Recurrent   • Diabetes mellitus (HCC)    • Dyslipidemia    • Glaucoma    • High cholesterol    • Nausea & vomiting    • S/P insertion of IVC (inferior vena caval) filter    • Sleep apnea     no cpap   • Stroke (HCC) 2000      Current Outpatient Medications   Medication Sig Dispense Refill   • pioglitazone (ACTOS) 30 mg tablet Take 30 mg by mouth two (2) times a day.     • calcium polycarbophiL (Fiber Laxative, ca polycarbo,) 625 mg tablet Take 625 mg by mouth daily.     • fluticasone (FLONASE) 50 mcg/actuation nasal spray 1 [place] by Both Nostrils route daily.     • ACETAMINOPHEN/DIPHENHYDRAMINE (TYLENOL PM PO) Take 500 mg by mouth as needed ( pain and sleep aid as needed).     • apixaban (ELIQUIS) 5 mg tablet Take 5 mg by mouth two (2) times a day.     • SITAGLIPTIN PHOSPHATE (JANUVIA PO) Take 100 mg by mouth daily.     • pravastatin (PRAVACHOL) 20 mg tablet Take 20 mg by mouth nightly.       • tamsulosin (FLOMAX) 0.4 mg capsule Take 0.4 mg by mouth daily.       • MULTIVITAMIN PO Take 1 Tab by mouth daily.         No Known Allergies     Social History     Tobacco Use   • Smoking status: Never Smoker   Smokeless tobacco: Never Used   Substance Use Topics    Alcohol use: No     Comment: occasionally    Drug use: No         Review of Systems   Constitutional: Negative for chills, fever and weight loss. HENT: Negative for nosebleeds. Eyes: Negative for blurred vision and double vision. Respiratory: Negative for cough, shortness of breath and wheezing. Cardiovascular: Negative for chest pain, palpitations, orthopnea, claudication, leg swelling and PND. Gastrointestinal: Negative for abdominal pain, heartburn, nausea and vomiting. Genitourinary: Negative for dysuria and hematuria. Musculoskeletal: Negative for falls and myalgias. Skin: Negative for rash. Neurological: Positive for dizziness. Negative for focal weakness and headaches. Endo/Heme/Allergies: Does not bruise/bleed easily. Psychiatric/Behavioral: Negative for substance abuse. Visit Vitals  /80 (BP 1 Location: Left upper arm, BP Patient Position: Sitting, BP Cuff Size: Small adult)   Pulse (!) 50   Ht 5' 7\" (1.702 m)   Wt 87.1 kg (192 lb)   SpO2 99%   BMI 30.07 kg/m²      Physical Exam   Constitutional: He is oriented to person, place, and time. He appears well-developed and well-nourished. HENT:   Head: Normocephalic and atraumatic. Eyes: Conjunctivae are normal.   Neck: Neck supple. No JVD present. Carotid bruit is not present. Cardiovascular: Regular rhythm, S1 normal, S2 normal and normal pulses. Bradycardia present. Exam reveals no gallop and no S3. No murmur heard. Pulmonary/Chest: Breath sounds normal. He has no wheezes. He has no rales. Abdominal: Soft. Bowel sounds are normal. There is no abdominal tenderness. Musculoskeletal:         General: Edema (Left lower extremity) present. Neurological: He is alert and oriented to person, place, and time. Skin: Skin is warm and dry.      EKG: Sinus bradycardia, left anterior fascicular block, nonspecific IVCD, Poor R-wave progression, no ST or T wave abnormalities. Compared to the previous EKG, no significant interval change    ASSESSMENT and PLAN    History of asymptomatic bradycardia. The patient continues to have a low resting heart rate in the 40s to 50s. He has complained of 2 separate dizzy spells over the past 2 months. He does have a borderline first-degree AV block with left anterior fascicular block, so I would like to repeat a 40-hour Holter monitor to ensure he is not having any episodes of high-grade AV block or complete heart block. Dyslipidemia. Patient is now managed with pravastatin 20 mg daily. This is been followed by his PCP. Diabetes mellitus, type II on oral agents also been managed by his primary care. Goal A1c should be less than 7. Recurrent DVTs, now on Eliquis for anticoagulation and does have a IVC filter in place. As result he does have chronic lower extremity swelling. History of CVA, with residual right-sided deficit. He is now on a statin and Eliquis for secondary prevention. Follow-up annually, sooner if needed.

## 2021-04-19 ENCOUNTER — TELEPHONE (OUTPATIENT)
Dept: CARDIOLOGY CLINIC | Age: 76
End: 2021-04-19

## 2021-04-19 NOTE — TELEPHONE ENCOUNTER
----- Message from Chio Diaz MD sent at 4/19/2021  3:42 PM EDT ----- Patient with 2 prolonged pauses on his Holter monitor. I would like to call the patient and discussed with him as he may need a pacemaker in the future. 
----- Message ----- From: Eagle Muñoz Sent: 4/19/2021   3:09 PM EDT To: Chio Diaz MD 
 
Per your last note\" History of asymptomatic bradycardia. The patient continues to have a low resting heart rate in the 40s to 50s. He has complained of 2 separate dizzy spells over the past 2 months.   He does have a borderline first-degree AV block with left anterior fascicular block, so I would like to repeat a 40-hour Holter monitor to ensure he is not having any episodes of high-grade AV block or complete heart block.

## 2021-04-19 NOTE — TELEPHONE ENCOUNTER
Dr. Carmen Ybarra called patient to discuss pacemaker, patient currently in [facility] for the summer understands if he begins to have any symptoms to go directly to ER.

## 2021-04-20 ENCOUNTER — TELEPHONE (OUTPATIENT)
Dept: CARDIOLOGY CLINIC | Age: 76
End: 2021-04-20

## 2021-04-20 DIAGNOSIS — R00.1 BRADYCARDIA: Primary | ICD-10-CM

## 2021-04-20 NOTE — TELEPHONE ENCOUNTER
Patient needs a pacemaker and is living with daughter in [place] , Referral for patient to see another physician closer to them and to send records to schedule appt. Fax number per daughter who lives local is [phone] Records sent

## 2021-11-23 ENCOUNTER — OFFICE VISIT (OUTPATIENT)
Dept: CARDIOLOGY CLINIC | Age: 76
End: 2021-11-23
Payer: MEDICARE

## 2021-11-23 VITALS
HEIGHT: 67 IN | DIASTOLIC BLOOD PRESSURE: 82 MMHG | HEART RATE: 53 BPM | BODY MASS INDEX: 32.33 KG/M2 | SYSTOLIC BLOOD PRESSURE: 134 MMHG | WEIGHT: 206 LBS | OXYGEN SATURATION: 97 %

## 2021-11-23 DIAGNOSIS — Z01.818 PRE-OP TESTING: ICD-10-CM

## 2021-11-23 DIAGNOSIS — E78.00 HYPERCHOLESTEROLEMIA: ICD-10-CM

## 2021-11-23 DIAGNOSIS — I49.5 SICK SINUS SYNDROME (HCC): Primary | ICD-10-CM

## 2021-11-23 DIAGNOSIS — R42 DIZZINESS: ICD-10-CM

## 2021-11-23 DIAGNOSIS — I82.509 CHRONIC DEEP VEIN THROMBOSIS (DVT) OF LOWER EXTREMITY, UNSPECIFIED LATERALITY, UNSPECIFIED VEIN (HCC): ICD-10-CM

## 2021-11-23 DIAGNOSIS — R94.31 ABNORMAL ELECTROCARDIOGRAM: ICD-10-CM

## 2021-11-23 PROCEDURE — G8510 SCR DEP NEG, NO PLAN REQD: HCPCS | Performed by: INTERNAL MEDICINE

## 2021-11-23 PROCEDURE — 99215 OFFICE O/P EST HI 40 MIN: CPT | Performed by: INTERNAL MEDICINE

## 2021-11-23 PROCEDURE — 93000 ELECTROCARDIOGRAM COMPLETE: CPT | Performed by: INTERNAL MEDICINE

## 2021-11-23 PROCEDURE — 1101F PT FALLS ASSESS-DOCD LE1/YR: CPT | Performed by: INTERNAL MEDICINE

## 2021-11-23 PROCEDURE — G8417 CALC BMI ABV UP PARAM F/U: HCPCS | Performed by: INTERNAL MEDICINE

## 2021-11-23 PROCEDURE — G8536 NO DOC ELDER MAL SCRN: HCPCS | Performed by: INTERNAL MEDICINE

## 2021-11-23 PROCEDURE — G8427 DOCREV CUR MEDS BY ELIG CLIN: HCPCS | Performed by: INTERNAL MEDICINE

## 2021-11-23 RX ORDER — SODIUM CHLORIDE 0.9 % (FLUSH) 0.9 %
5-40 SYRINGE (ML) INJECTION AS NEEDED
Status: CANCELLED | OUTPATIENT
Start: 2021-11-23

## 2021-11-23 RX ORDER — SODIUM CHLORIDE 0.9 % (FLUSH) 0.9 %
5-40 SYRINGE (ML) INJECTION EVERY 8 HOURS
Status: CANCELLED | OUTPATIENT
Start: 2021-11-23

## 2021-11-23 RX ORDER — SODIUM CHLORIDE 9 MG/ML
1000 INJECTION, SOLUTION INTRAVENOUS CONTINUOUS
Status: CANCELLED | OUTPATIENT
Start: 2021-11-23

## 2021-11-23 NOTE — PROGRESS NOTES
HISTORY OF PRESENT ILLNESS  Pat Davies is a 67 y.o. male. Follow-up  Pertinent negatives include no chest pain, no abdominal pain, no headaches and no shortness of breath. Slow Heart Rate  Pertinent negatives include no chest pain, no abdominal pain, no headaches and no shortness of breath. Patient presents for a followup office visit. He has a past medical history significant for asymptomatic bradycardia with heart rates in the upper 30s to low 50s. He also has a history of a previous CVA with residual right-sided deficit, history of recurrent DVTs, status post IVC filter, on chronic oral anticoagulation therapy. He last underwent an echocardiogram in February 2011 which showed normal LV size and systolic function. Grade 1 diastolic dysfunction, no significant valvular heart disease. He also underwent a 24-hour Holter monitor which did not show any prolonged pauses or episodes of high-grade AV block. Patient remains on Eliquis for oral anticoagulation. 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 The patient was last seen in our office approximately 7 to 8 months ago. At last visit, he was complaining of dizzy spells. He states he had 2 near syncopal episodes, so he wore a Holter monitor in April 2021 which was consistent with sick sinus syndrome. He had 2 prolonged pauses greater than 3.5 seconds in duration with the longest pause being 4.3 seconds. His average heart rate was 49 bpm.  A pacemaker was recommended at that time, however, the patient had already traveled to [place] to stay with his daughter where he lives for half the year. I did contact the patient's family and recommended pursuing a pacemaker there if he was having any concerning symptoms. Apparently, cardiology consultation was never sought out.   He states that while staying in [place] he did have a fall with his walker broke and gave way. He injured his left elbow, but did not sustain any head injuries. He denies having a leslie syncopal episode. He feels his dizzy spells are now more frequent than they have been in the past.  He denies any chest pain or shortness of breath. He does have chronic leg swelling which has not changed. Past Medical History:   Diagnosis Date    Abnormal electrocardiogram     BPH (benign prostatic hyperplasia)     Bradycardia     Asymptomatic    Cardiac echocardiogram 02/14/2011    EF 60-65%. No RWMA. Gr 1 DDfx. Mild LAE. Atrial septal aneurysm. No R-L shunt. Mild MARLON.  Cardiac Holter monitor study 02/21/2011    Sinus rhythm, avg HR 48 bpm (range  bpm). Occasional single VE (bigeminy noted). Occasional SVT (single, pairs, multifocal). Brief irregular runs of SVT suggest A-fib.  Cardiac nuclear imaging test 06/02/2008    [facility], [address]:  No perfusion abnormalities. EF 49%. Neg EKG on pharm stress test.      Cardiovascular LE arterial testing 12/21/2009    No evidence of disease gisel.  CVA (cerebral infarction) 2000    Deep venous thrombosis (HCC) 10/22/2010    Recurrent    Diabetes mellitus (HCC)     Dyslipidemia     Glaucoma     High cholesterol     Nausea & vomiting     S/P insertion of IVC (inferior vena caval) filter     Sleep apnea     no cpap    Stroke [facility]) 2000      Current Outpatient Medications   Medication Sig Dispense Refill    pioglitazone (ACTOS) 30 mg tablet Take 30 mg by mouth two (2) times a day.  calcium polycarbophiL (Fiber Laxative, ca polycarbo,) 625 mg tablet Take 625 mg by mouth daily.  fluticasone (FLONASE) 50 mcg/actuation nasal spray 1 [place] by Both Nostrils route daily.  ACETAMINOPHEN/DIPHENHYDRAMINE (TYLENOL PM PO) Take 500 mg by mouth as needed ( pain and sleep aid as needed).       apixaban (ELIQUIS) 5 mg tablet Take 5 mg by mouth two (2) times a day.      SITAGLIPTIN PHOSPHATE (JANUVIA PO) Take 100 mg by mouth daily.  pravastatin (PRAVACHOL) 20 mg tablet Take 20 mg by mouth nightly.  tamsulosin (FLOMAX) 0.4 mg capsule Take 0.4 mg by mouth daily.  MULTIVITAMIN PO Take 1 Tab by mouth daily. No Known Allergies     Social History     Tobacco Use    Smoking status: Never Smoker    Smokeless tobacco: Never Used   Substance Use Topics    Alcohol use: No     Comment: occasionally    Drug use: No     Family History   Problem Relation Age of Onset    Cancer Mother     Heart Disease Father          Review of Systems   Constitutional: Negative for chills, fever and weight loss. HENT: Negative for nosebleeds. Eyes: Negative for blurred vision and double vision. Respiratory: Negative for cough, shortness of breath and wheezing. Cardiovascular: Negative for chest pain, palpitations, orthopnea, claudication, leg swelling and PND. Gastrointestinal: Negative for abdominal pain, heartburn, nausea and vomiting. Genitourinary: Negative for dysuria and hematuria. Musculoskeletal: Negative for falls and myalgias. Skin: Negative for rash. Neurological: Positive for dizziness. Negative for focal weakness and headaches. Endo/Heme/Allergies: Does not bruise/bleed easily. Psychiatric/Behavioral: Negative for substance abuse. Visit Vitals  /82 (BP 1 Location: Left upper arm, BP Patient Position: Sitting, BP Cuff Size: Small adult)   Pulse (!) 53   Ht 5' 7\" (1.702 m)   Wt 93.4 kg (206 lb)   SpO2 97%   BMI 32.26 kg/m²      Physical Exam  Constitutional:       Appearance: He is well-developed. HENT:      Head: Normocephalic and atraumatic. Eyes:      Conjunctiva/sclera: Conjunctivae normal.   Neck:      Vascular: No carotid bruit or JVD. Cardiovascular:      Rate and Rhythm: Regular rhythm. Bradycardia present. Pulses: Normal pulses. Heart sounds: S1 normal and S2 normal. No murmur heard. No gallop.  No S3 sounds. Pulmonary:      Breath sounds: Normal breath sounds. No wheezing or rales. Abdominal:      General: Bowel sounds are normal.      Palpations: Abdomen is soft. Tenderness: There is no abdominal tenderness. Musculoskeletal:      Cervical back: Neck supple. Skin:     General: Skin is warm and dry. Neurological:      Mental Status: He is alert and oriented to person, place, and time. EKG: Sinus bradycardia, left anterior fascicular block, nonspecific IVCD, Poor R-wave progression, no ST or T wave abnormalities. Compared to the previous EKG, no significant interval change. ASSESSMENT and PLAN    Sick sinus syndrome. Historically, patient has been asymptomatic to his bradycardia, however, earlier this year he did report to severe dizzy spells with near syncope. He wore a Holter monitor in April 2021 which is consistent with sick sinus syndrome. His average heart was 49 bpm and he had 2 prolonged pauses greater than 3.5 seconds in duration. This is irreversible. I have recommended proceeding with implantation of a dual-chamber permanent pacemaker in the near future. This was discussed at length with the patient and his daughter. Shared decision making achieved. They are willing to proceed with the procedure when scheduled. He will need to hold his Eliquis for 48 hours prior to the procedure. Dyslipidemia. Patient is now managed with pravastatin 20 mg daily. This is been followed by his PCP. Diabetes mellitus, type II on oral agents also been managed by his primary care. Goal A1c should be less than 7. Recurrent DVTs, now on Eliquis for anticoagulation and does have a IVC filter in place. As result he does have chronic lower extremity swelling. Brief interruption of anticoagulation as described above. History of CVA, with residual right-sided deficit. He is now on a statin and Eliquis for secondary prevention.     Follow-up will be scheduled following his pacemaker procedure.

## 2021-11-23 NOTE — PATIENT INSTRUCTIONS
Learning About a Pacemaker  What is a pacemaker? A pacemaker is a small device. It sends out mild electrical signals that keep your heart beating normally. The signals are painless. It can help stop the dizziness, fainting, and shortness of breath caused by a slow or unsteady heartbeat. A pacemaker is powered by batteries. Most pacemakers are placed under the skin of your chest. They have thin wires, called leads. The leads pass through a vein into your heart. A pacemaker can help restore a normal heart rate. It is used when certain problems have damaged the heart's electrical system, which normally keeps your heart beating steadily. You may feel worried about having a pacemaker. This is common. It can help if you learn about how the pacemaker helps your heart. Talk to your doctor about your concerns. How is a pacemaker put in place? You will get medicine before the procedure. It helps you relax and helps prevent pain. The doctor makes a cut in the skin just below your collarbone. The cut may be on either side of your chest. The doctor will put the pacemaker leads through the cut. The leads go into a large blood vessel in the upper chest. Then the doctor will guide the leads through the blood vessel into the heart. The leads are placed in one or two of the chambers in the heart. The doctor will place the pacemaker under the skin of your chest. The doctor will attach the leads to the pacemaker. Then the cut will be closed with stitches. What can you expect when you have a pacemaker? A pacemaker can help you return to a more normal, more active life. You'll need to use certain electric devices with caution. Some devices have a strong electromagnetic field. This field can keep your pacemaker from working right for a short time. These devices include things in your home, garage, or workplace.  Check with your doctor about what you need to avoid and what you need to keep a short distance away from your pacemaker. Many household and office electronics do not affect your pacemaker. Your doctor will check your pacemaker regularly to make sure it is working right. Pacemaker batteries usually last 5 to 15 years before they need to be replaced. Follow-up care is a key part of your treatment and safety. Be sure to make and go to all appointments, and call your doctor if you are having problems. It's also a good idea to know your test results and keep a list of the medicines you take. Where can you learn more? Go to http://www.gray.com/  Enter X268 in the search box to learn more about \"Learning About a Pacemaker. \"  Current as of: April 29, 2021               Content Version: 13.0  © 6764-3764 [employer]. Care instructions adapted under license by [employer] (which disclaims liability or warranty for this information). If you have questions about a medical condition or this instruction, always ask your healthcare professional. [place] [address] any warranty or liability for your use of this information. [facility]          Patient  EP Instructions                  1. You are scheduled to have a Implant of dual chamber pacemaker  on  December 8th , at 09:15am.    Please check in at 08:15am.    2. Please go to [facility] and park in the outpatient parking lot that is located around to the back of the hospital and enter through the [employer]. Once you enter through the [employer] [place] check in with the  there. The  will either give you directions or assist you in getting to the cath holding area. 3.  You are not to eat or drink anything after midnight the night before your                   procedure.      4. Please continue to take your medications with a small sip of water on the morning of the procedure with the following exceptions:  Hold Eliquis for 48 hours prior to procedure     5. If you are diabetic, do not take your insulin/sugar pill the morning of the procedure. 6. We encourage families to wait in the waiting room on the first floor while the procedure is being done. The Doctor will come out and talk with you as soon as the procedure is over. 7. There is the possibility that you may spend the night in the hospital, depending on the results of the procedure. This will be determined after the procedure is done. 8.   If you or your family have any questions, please call our office Monday-Friday 9:00am         -4:30 pm , at [phone], and ask to speak to one of the nurses.       Please have lab work and chest x-ray done next week at [facility] or [facility]

## 2021-11-23 NOTE — LETTER
11/23/2021 2:32 PM    Sourav Flood  [id number]  8/70/2019        Insurance:  Medicare                  [address] # _____________________      Proc Date: Wed 12/8                Proc Time:  9:15      Performing MD : Dr. Parisa Esqueda                      Procedure:Dual Pacemaker                                         Scheduled with:  Letitia Engel                                                 Date:11/23/2021          HP: 11/23      EKG: ______    Labs:______  CXR: _______  Orders:  11/23          Special Instructions:  _____________________________________________________  ______________________________________________________________________  ______________________________________________________________________          The materials enclosed with this facsimile transmission are private and confidential and are the property of the sender. If you are not the intended recipient, be advised that any unauthorized use, disclosure, copying, distribution, or the taking of any action in reliance on the contents of this telecopied information is strictly prohibited. If you have received this in error, please immediately notify the sender via telephone to arrange for return of the forwarded documentation.

## 2021-11-23 NOTE — PROGRESS NOTES
Chapa Deborah presents today for   Chief Complaint   Patient presents with    Follow-up     was not able to get a pacemaker in [place], wants to discuss getting one       Eduard Cabrera preferred language for health care discussion is english/other. Is someone accompanying this pt? yes    Is the patient using any DME equipment during [address]? walker    Depression Screening:  3 most recent PHQ Screens 11/23/2021   Little interest or pleasure in doing things Not at all   Feeling down, depressed, irritable, or hopeless Not at all   Total Score PHQ 2 0       Learning Assessment:  Learning Assessment 11/23/2021   PRIMARY LEARNER Patient   CO-LEARNER CAREGIVER -   PRIMARY LANGUAGE ENGLISH   LEARNER PREFERENCE PRIMARY DEMONSTRATION   ANSWERED BY patient   RELATIONSHIP SELF       Abuse Screening:  Abuse Screening Questionnaire 11/23/2021   Do you ever feel afraid of your partner? N   Are you in a relationship with someone who physically or mentally threatens you? N   Is it safe for you to go home? Y       Fall Risk  Fall Risk Assessment, last 12 mths 11/23/2021   Able to walk? Yes   Fall in past 12 months? 0   Do you feel unsteady? 0   Are you worried about falling 0           Pt currently taking Anticoagulant therapy? Eliquis 5 mg twice a day    Pt currently taking Antiplatelet therapy ? no      Coordination of Care:  1. Have you been to the ER, urgent care clinic since your last visit? Hospitalized since your last visit? no    2. Have you seen or consulted any other health care providers outside of the [address] [place] [address] since your last visit? Include any pap smears or colon screening.  no

## 2021-12-01 ENCOUNTER — HOSPITAL ENCOUNTER (OUTPATIENT)
Dept: LAB | Age: 76
Discharge: HOME | End: 2021-12-01
Payer: MEDICARE

## 2021-12-01 ENCOUNTER — HOSPITAL ENCOUNTER (OUTPATIENT)
Dept: GENERAL RADIOLOGY | Age: 76
Discharge: HOME | End: 2021-12-01
Payer: MEDICARE

## 2021-12-01 DIAGNOSIS — Z01.818 PRE-OP TESTING: ICD-10-CM

## 2021-12-01 DIAGNOSIS — I49.5 SICK SINUS SYNDROME (HCC): ICD-10-CM

## 2021-12-01 DIAGNOSIS — R94.31 ABNORMAL ELECTROCARDIOGRAM: ICD-10-CM

## 2021-12-01 LAB
ANION GAP SERPL CALC-SCNC: 2 MMOL/L (ref 3–18)
BASOPHILS # BLD: 0 K/UL (ref 0–0.1)
BASOPHILS NFR BLD: 0 % (ref 0–2)
BUN SERPL-MCNC: 29 MG/DL (ref 7–18)
BUN/CREAT SERPL: 33 (ref 12–20)
CALCIUM SERPL-MCNC: 8.5 MG/DL (ref 8.5–10.1)
CHLORIDE SERPL-SCNC: 106 MMOL/L (ref 100–111)
CO2 SERPL-SCNC: 31 MMOL/L (ref 21–32)
CREAT SERPL-MCNC: 0.88 MG/DL (ref 0.6–1.3)
DIFFERENTIAL METHOD BLD: ABNORMAL
EOSINOPHIL # BLD: 0.2 K/UL (ref 0–0.4)
EOSINOPHIL NFR BLD: 3 % (ref 0–5)
ERYTHROCYTE [DISTWIDTH] IN BLOOD BY AUTOMATED COUNT: 13.3 % (ref 11.6–14.5)
GLUCOSE SERPL-MCNC: 169 MG/DL (ref 74–99)
HCT VFR BLD AUTO: 40.8 % (ref 36–48)
HGB BLD-MCNC: 13.4 G/DL (ref 13–16)
IMM GRANULOCYTES # BLD AUTO: 0 K/UL (ref 0–0.04)
IMM GRANULOCYTES NFR BLD AUTO: 0 % (ref 0–0.5)
INR PPP: 1.1 (ref 0.8–1.2)
LYMPHOCYTES # BLD: 1.3 K/UL (ref 0.9–3.6)
LYMPHOCYTES NFR BLD: 24 % (ref 21–52)
MCH RBC QN AUTO: 31.4 PG (ref 24–34)
MCHC RBC AUTO-ENTMCNC: 32.8 G/DL (ref 31–37)
MCV RBC AUTO: 95.6 FL (ref 78–100)
MONOCYTES # BLD: 0.5 K/UL (ref 0.05–1.2)
MONOCYTES NFR BLD: 10 % (ref 3–10)
NEUTS SEG # BLD: 3.2 K/UL (ref 1.8–8)
NEUTS SEG NFR BLD: 62 % (ref 40–73)
NRBC # BLD: 0 K/UL (ref 0–0.01)
NRBC BLD-RTO: 0 PER 100 WBC
PLATELET # BLD AUTO: 185 K/UL (ref 135–420)
PMV BLD AUTO: 11.8 FL (ref 9.2–11.8)
POTASSIUM SERPL-SCNC: 4.3 MMOL/L (ref 3.5–5.5)
PROTHROMBIN TIME: 14.4 SEC (ref 11.5–15.2)
RBC # BLD AUTO: 4.27 M/UL (ref 4.35–5.65)
SODIUM SERPL-SCNC: 139 MMOL/L (ref 136–145)
WBC # BLD AUTO: 5.2 K/UL (ref 4.6–13.2)

## 2021-12-01 PROCEDURE — 85610 PROTHROMBIN TIME: CPT

## 2021-12-01 PROCEDURE — 85025 COMPLETE CBC W/AUTO DIFF WBC: CPT

## 2021-12-01 PROCEDURE — 80048 BASIC METABOLIC PNL TOTAL CA: CPT

## 2021-12-01 PROCEDURE — 36415 COLL VENOUS BLD VENIPUNCTURE: CPT

## 2021-12-01 PROCEDURE — 71046 X-RAY EXAM CHEST 2 VIEWS: CPT

## 2021-12-07 ENCOUNTER — TELEPHONE (OUTPATIENT)
Dept: CARDIOLOGY CLINIC | Age: 76
End: 2021-12-07

## 2021-12-07 DIAGNOSIS — I49.5 SICK SINUS SYNDROME (HCC): ICD-10-CM

## 2021-12-07 DIAGNOSIS — Z01.818 PRE-OP TESTING: ICD-10-CM

## 2021-12-07 DIAGNOSIS — R00.1 BRADYCARDIA: Primary | ICD-10-CM

## 2021-12-07 NOTE — TELEPHONE ENCOUNTER
Received call from patient's daughter stating patient forgot to hold his Eliquis starting yesterday for procedure tomorrow. Discussed with Dr. Rosaline Dietrich. Verbal order and read back per Kamila Jasso MD  - reschedule procedure for next Wednesday. - patient's daughter made aware  - aware of need for new lab work prior to next week  - procedure rescheduled for 12/15/21 at 10:30am. Patient to arrive at 09:30am.  - instructions reviewed for procedure next week and daughter states understanding.

## 2021-12-10 ENCOUNTER — HOSPITAL ENCOUNTER (OUTPATIENT)
Dept: LAB | Age: 76
Discharge: HOME | End: 2021-12-10
Payer: MEDICARE

## 2021-12-10 DIAGNOSIS — I49.5 SICK SINUS SYNDROME (HCC): ICD-10-CM

## 2021-12-10 DIAGNOSIS — Z01.818 PRE-OP TESTING: ICD-10-CM

## 2021-12-10 DIAGNOSIS — R00.1 BRADYCARDIA: ICD-10-CM

## 2021-12-10 LAB
ANION GAP SERPL CALC-SCNC: 3 MMOL/L (ref 3–18)
BASOPHILS # BLD: 0 K/UL (ref 0–0.1)
BASOPHILS NFR BLD: 0 % (ref 0–2)
BUN SERPL-MCNC: 24 MG/DL (ref 7–18)
BUN/CREAT SERPL: 27 (ref 12–20)
CALCIUM SERPL-MCNC: 9.1 MG/DL (ref 8.5–10.1)
CHLORIDE SERPL-SCNC: 106 MMOL/L (ref 100–111)
CO2 SERPL-SCNC: 29 MMOL/L (ref 21–32)
CREAT SERPL-MCNC: 0.88 MG/DL (ref 0.6–1.3)
DIFFERENTIAL METHOD BLD: NORMAL
EOSINOPHIL # BLD: 0.1 K/UL (ref 0–0.4)
EOSINOPHIL NFR BLD: 3 % (ref 0–5)
ERYTHROCYTE [DISTWIDTH] IN BLOOD BY AUTOMATED COUNT: 13.4 % (ref 11.6–14.5)
GLUCOSE SERPL-MCNC: 115 MG/DL (ref 74–99)
HCT VFR BLD AUTO: 44.8 % (ref 36–48)
HGB BLD-MCNC: 14.3 G/DL (ref 13–16)
IMM GRANULOCYTES # BLD AUTO: 0 K/UL (ref 0–0.04)
IMM GRANULOCYTES NFR BLD AUTO: 0 % (ref 0–0.5)
INR PPP: 1.1 (ref 0.8–1.2)
LYMPHOCYTES # BLD: 1.2 K/UL (ref 0.9–3.6)
LYMPHOCYTES NFR BLD: 25 % (ref 21–52)
MCH RBC QN AUTO: 30.2 PG (ref 24–34)
MCHC RBC AUTO-ENTMCNC: 31.9 G/DL (ref 31–37)
MCV RBC AUTO: 94.7 FL (ref 78–100)
MONOCYTES # BLD: 0.5 K/UL (ref 0.05–1.2)
MONOCYTES NFR BLD: 10 % (ref 3–10)
NEUTS SEG # BLD: 3 K/UL (ref 1.8–8)
NEUTS SEG NFR BLD: 62 % (ref 40–73)
NRBC # BLD: 0 K/UL (ref 0–0.01)
NRBC BLD-RTO: 0 PER 100 WBC
PLATELET # BLD AUTO: 161 K/UL (ref 135–420)
PMV BLD AUTO: 11.4 FL (ref 9.2–11.8)
POTASSIUM SERPL-SCNC: 4.4 MMOL/L (ref 3.5–5.5)
PROTHROMBIN TIME: 13.8 SEC (ref 11.5–15.2)
RBC # BLD AUTO: 4.73 M/UL (ref 4.35–5.65)
SODIUM SERPL-SCNC: 138 MMOL/L (ref 136–145)
WBC # BLD AUTO: 4.8 K/UL (ref 4.6–13.2)

## 2021-12-10 PROCEDURE — 85610 PROTHROMBIN TIME: CPT

## 2021-12-10 PROCEDURE — 36415 COLL VENOUS BLD VENIPUNCTURE: CPT

## 2021-12-10 PROCEDURE — 85025 COMPLETE CBC W/AUTO DIFF WBC: CPT

## 2021-12-10 PROCEDURE — 80048 BASIC METABOLIC PNL TOTAL CA: CPT

## 2021-12-15 ENCOUNTER — HOSPITAL ENCOUNTER (OUTPATIENT)
Age: 76
Setting detail: OUTPATIENT SURGERY
Discharge: HOME | End: 2021-12-15
Attending: INTERNAL MEDICINE | Admitting: INTERNAL MEDICINE
Payer: MEDICARE

## 2021-12-15 ENCOUNTER — APPOINTMENT (OUTPATIENT)
Dept: GENERAL RADIOLOGY | Age: 76
End: 2021-12-15
Attending: INTERNAL MEDICINE
Payer: MEDICARE

## 2021-12-15 VITALS
RESPIRATION RATE: 23 BRPM | DIASTOLIC BLOOD PRESSURE: 70 MMHG | HEART RATE: 58 BPM | SYSTOLIC BLOOD PRESSURE: 116 MMHG | OXYGEN SATURATION: 96 %

## 2021-12-15 DIAGNOSIS — I49.5 SICK SINUS SYNDROME (HCC): ICD-10-CM

## 2021-12-15 DIAGNOSIS — Z95.0 PACEMAKER: Primary | ICD-10-CM

## 2021-12-15 PROCEDURE — 99153 MOD SED SAME PHYS/QHP EA: CPT | Performed by: INTERNAL MEDICINE

## 2021-12-15 PROCEDURE — 74011250637 HC RX REV CODE- 250/637: Performed by: INTERNAL MEDICINE

## 2021-12-15 PROCEDURE — C1769 GUIDE WIRE: HCPCS | Performed by: INTERNAL MEDICINE

## 2021-12-15 PROCEDURE — 77030002996 HC SUT SLK J&J -A: Performed by: INTERNAL MEDICINE

## 2021-12-15 PROCEDURE — C1898 LEAD, PMKR, OTHER THAN TRANS: HCPCS | Performed by: INTERNAL MEDICINE

## 2021-12-15 PROCEDURE — C1785 PMKR, DUAL, RATE-RESP: HCPCS | Performed by: INTERNAL MEDICINE

## 2021-12-15 PROCEDURE — 71045 X-RAY EXAM CHEST 1 VIEW: CPT

## 2021-12-15 PROCEDURE — 99152 MOD SED SAME PHYS/QHP 5/>YRS: CPT | Performed by: INTERNAL MEDICINE

## 2021-12-15 PROCEDURE — 77030018729 HC ELECTRD DEFIB PAD CARD -B: Performed by: INTERNAL MEDICINE

## 2021-12-15 PROCEDURE — 74011000250 HC RX REV CODE- 250: Performed by: INTERNAL MEDICINE

## 2021-12-15 PROCEDURE — C1893 INTRO/SHEATH, FIXED,NON-PEEL: HCPCS | Performed by: INTERNAL MEDICINE

## 2021-12-15 PROCEDURE — 77030022017 HC DRSG HEMO QCLOT ZMED -A: Performed by: INTERNAL MEDICINE

## 2021-12-15 PROCEDURE — 74011250636 HC RX REV CODE- 250/636: Performed by: INTERNAL MEDICINE

## 2021-12-15 PROCEDURE — C1892 INTRO/SHEATH,FIXED,PEEL-AWAY: HCPCS | Performed by: INTERNAL MEDICINE

## 2021-12-15 PROCEDURE — 74011000636 HC RX REV CODE- 636: Performed by: INTERNAL MEDICINE

## 2021-12-15 PROCEDURE — 77030019580 HC CBL PACE MEDT -B: Performed by: INTERNAL MEDICINE

## 2021-12-15 PROCEDURE — 77030031139 HC SUT VCRL2 J&J -A: Performed by: INTERNAL MEDICINE

## 2021-12-15 PROCEDURE — 77030040375: Performed by: INTERNAL MEDICINE

## 2021-12-15 PROCEDURE — 33208 INSRT HEART PM ATRIAL & VENT: CPT | Performed by: INTERNAL MEDICINE

## 2021-12-15 DEVICE — IPG W1DR01 AZURE XT DR MRI USA
Type: IMPLANTABLE DEVICE | Status: FUNCTIONAL
Brand: AZURE™ XT DR MRI SURESCAN™

## 2021-12-15 DEVICE — LEAD 407658 CAPSUREFIX NOVUS US MRI
Type: IMPLANTABLE DEVICE | Status: FUNCTIONAL
Brand: CAPSUREFIX NOVUS MRI™ SURESCAN™

## 2021-12-15 DEVICE — LEAD 407652 CAPSUREFIX NOVUS US MRI
Type: IMPLANTABLE DEVICE | Status: FUNCTIONAL
Brand: CAPSUREFIX NOVUS MRI™ SURESCAN™

## 2021-12-15 RX ORDER — LIDOCAINE HYDROCHLORIDE 10 MG/ML
INJECTION, SOLUTION EPIDURAL; INFILTRATION; INTRACAUDAL; PERINEURAL AS NEEDED
Status: DISCONTINUED | OUTPATIENT
Start: 2021-12-15 | End: 2021-12-15 | Stop reason: HOSPADM

## 2021-12-15 RX ORDER — MIDAZOLAM HYDROCHLORIDE 1 MG/ML
INJECTION, SOLUTION INTRAMUSCULAR; INTRAVENOUS AS NEEDED
Status: DISCONTINUED | OUTPATIENT
Start: 2021-12-15 | End: 2021-12-15 | Stop reason: HOSPADM

## 2021-12-15 RX ORDER — OXYCODONE AND ACETAMINOPHEN 5; 325 MG/1; MG/1
1 TABLET ORAL
Qty: 15 TABLET | Refills: 0 | Status: SHIPPED | OUTPATIENT
Start: 2021-12-15 | End: 2021-12-20

## 2021-12-15 RX ORDER — CEFAZOLIN SODIUM 1 G/3ML
INJECTION, POWDER, FOR SOLUTION INTRAMUSCULAR; INTRAVENOUS AS NEEDED
Status: DISCONTINUED | OUTPATIENT
Start: 2021-12-15 | End: 2021-12-15 | Stop reason: HOSPADM

## 2021-12-15 RX ORDER — ONDANSETRON 2 MG/ML
4 INJECTION INTRAMUSCULAR; INTRAVENOUS
Status: DISCONTINUED | OUTPATIENT
Start: 2021-12-15 | End: 2021-12-15 | Stop reason: HOSPADM

## 2021-12-15 RX ORDER — OXYCODONE AND ACETAMINOPHEN 5; 325 MG/1; MG/1
1 TABLET ORAL
Status: DISCONTINUED | OUTPATIENT
Start: 2021-12-15 | End: 2021-12-15 | Stop reason: HOSPADM

## 2021-12-15 RX ORDER — ACETAMINOPHEN 325 MG/1
650 TABLET ORAL
Status: DISCONTINUED | OUTPATIENT
Start: 2021-12-15 | End: 2021-12-15 | Stop reason: HOSPADM

## 2021-12-15 RX ORDER — FENTANYL CITRATE 50 UG/ML
INJECTION, SOLUTION INTRAMUSCULAR; INTRAVENOUS AS NEEDED
Status: DISCONTINUED | OUTPATIENT
Start: 2021-12-15 | End: 2021-12-15 | Stop reason: HOSPADM

## 2021-12-15 RX ADMIN — ACETAMINOPHEN 650 MG: 325 TABLET ORAL at 16:21

## 2021-12-15 NOTE — DISCHARGE INSTRUCTIONS
Patient Education        Resume Eliquis on Saturday, December 18. Pacemaker Placement: What to Expect at [address] placement is surgery to put a pacemaker in your chest. This surgery may be done if you have bradycardia (a slow heart rate). Your doctor made a cut (incision) in your chest. The doctor put the pacemaker leads through the cut, into a large blood vessel, then into the heart. The doctor put the pacemaker under the skin of your chest and attached the leads to it. Your chest may be sore where the doctor made the cut. You also may have a bruise and mild swelling. These symptoms usually get better in 1 to 2 weeks. You may feel a hard ridge along the incision. This usually gets softer in the months after surgery. You may be able to see or feel the outline of the pacemaker under your skin. You will probably be able to go back to work or your usual routine 1 to 2 weeks after surgery. Pacemaker batteries usually last 5 to 15 years. Your doctor will talk to you about how often you will need to have your pacemaker checked. You'll need to take steps to safely use electric devices. Some of these devices can stop your pacemaker from working right for a short time. Check with your doctor about what to avoid and what to keep a short distance away from your pacemaker. For example, you will need to stay away from things with strong magnetic and electrical fields. An example is an MRI machine (unless your pacemaker is safe for an MRI). You can use a cellphone and other wireless devices, but keep them at least 6 inches away from your pacemaker. Many household and office electronics don't affect a pacemaker. These include kitchen appliances and computers. This care sheet gives you a general idea about how long it will take for you to recover. But each person recovers at a different pace. Follow the steps below to get better as quickly as possible.   How can you care for yourself at home?  Activity    · Rest when you feel tired.     · Be active. Walking is a good choice.     · For 4 to 6 weeks:  ? Avoid activities that strain your chest or upper arm muscles. This includes pushing a  or vacuum, or mopping floors. It also includes swimming, or swinging a golf club or tennis racquet. ? Do not raise your arm (the one on the side of your body where the pacemaker is located) above your shoulder. ? Allow your body to heal. Don't move quickly or lift anything heavy until you are feeling better.     · Many people are able to return to work within 1 to 2 weeks after surgery.     · Ask your doctor when it is okay for you to have sex. Diet    · You can eat your normal diet. If your stomach is upset, try bland, low-fat foods like plain rice, broiled chicken, toast, and yogurt. Medicines    · Your doctor will tell you if and when you can restart your medicines. He or she will also give you instructions about taking any new medicines.     · If you take aspirin or some other blood thinner, be sure to talk to your doctor. He or she will tell you if and when to start taking this medicine again. Make sure that you understand exactly what your doctor wants you to do.     · Be safe with medicines. Read and follow all instructions on the label. ? If the doctor gave you a prescription medicine for pain, take it as prescribed. ? If you are not taking a prescription pain medicine, ask your doctor if you can take an over-the-counter medicine. ? Do not take aspirin, ibuprofen (Advil, Motrin), naproxen (Aleve), or other nonsteroidal anti-inflammatory drugs (NSAIDs) unless your doctor says it is okay.     · If your doctor prescribed antibiotics, take them as directed. Do not stop taking them just because you feel better. You need to take the full course of antibiotics.    Incision care    · If you have strips of tape on the incision, leave the tape on for a week or until it falls off.     · Keep the incision dry while it heals. Your doctor may recommend sponge baths for about 7 days, but do not get the incision wet. Your doctor will let you know when you may take showers. After a shower, pat the incision dry.     · Don't use hydrogen peroxide or alcohol on the incision, which can slow healing. You may cover the area with a gauze bandage if it oozes fluid or rubs against clothing. Change the bandage every day.     · Do not take a bath or get into a hot tub for the first 2 weeks, or until your doctor tells you it is okay. Other instructions    · Keep a medical ID card with you at all times that says you have a pacemaker. The card should include the  and model information.     · Wear medical alert jewelry stating that you have a pacemaker. You can buy this at most [employer].     · Check your pulse as directed by your doctor.     · Have your pacemaker checked as often as your doctor recommends. In some cases, this may be done over the phone or the Internet. Your doctor will give you instructions about how to do this. Follow-up care is a key part of your treatment and safety. Be sure to make and go to all appointments, and call your doctor if you are having problems. It's also a good idea to know your test results and keep a list of the medicines you take. When should you call for help? Call 911 anytime you think you may need emergency care. For example, call if:    · You passed out (lost consciousness).     · You have trouble breathing.    Call your doctor now or seek immediate medical care if:    · You are dizzy or light-headed, or you feel like you may faint.     · You have pain that does not get better after you take pain medicine.     · You hear an alarm or feel a vibration from your pacemaker.     · You have loose stitches, or your incision comes open.     · Bright red blood has soaked through the bandage over your incision.     · You have signs of infection, such as:  ? Increased pain, swelling, warmth, or redness. ? Red streaks leading from the incision. ? Pus draining from the incision. ? A fever. Watch closely for changes in your health, and be sure to contact your doctor if:    · You have any problems with your pacemaker. Where can you learn more? Go to http://www.gray.com/  Enter G550 in the search box to learn more about \"Pacemaker Placement: What to Expect at Home. \"  Current as of: April 29, 2021               Content Version: 13.0  © 1718-9586 [employer] ([employer]). Care instructions adapted under license by [employer] (which disclaims liability or warranty for this information). If you have questions about a medical condition or this instruction, always ask your healthcare professional. [address] any warranty or liability for your use of this information. DISCHARGE SUMMARY from Nurse    PATIENT INSTRUCTIONS:    After general anesthesia or intravenous sedation, for 24 hours or while taking prescription Narcotics:  · Limit your activities  · Do not drive and operate hazardous machinery  · Do not make important personal or business decisions  · Do  not drink alcoholic beverages  · If you have not urinated within 8 hours after discharge, please contact your surgeon on call. Report the following to your surgeon:  · Excessive pain, swelling, redness or odor of or around the surgical area  · Temperature over 100.5  · Nausea and vomiting lasting longer than 4 hours or if unable to take medications  · Any signs of decreased circulation or nerve impairment to extremity: change in color, persistent  numbness, tingling, coldness or increase pain  · Any questions    What to do at Home:  Recommended activity: No lifting, Driving, or Strenuous exercise for one week. If you experience any of the following symptoms bleeding,swelling,acute pain or numbness, fever, please follow up with Dr. Priyanka Pina.  Lynn Pleitez MD @ [phone]. .    *  Please give a list of your current medications to your Primary Care Provider. *  Please update this list whenever your medications are discontinued, doses are      changed, or new medications (including over-the-counter products) are added. *  Please carry medication information at all times in case of emergency situations. These are general instructions for a healthy lifestyle:    No smoking/ No tobacco products/ Avoid exposure to second hand smoke  Surgeon General's Warning:  Quitting smoking now greatly reduces serious risk to your health. Obesity, smoking, and sedentary lifestyle greatly increases your risk for illness    A healthy diet, regular physical exercise & weight monitoring are important for maintaining a healthy lifestyle    You may be retaining fluid if you have a history of heart failure or if you experience any of the following symptoms:  Weight gain of 3 pounds or more overnight or 5 pounds in a week, increased swelling in our hands or feet or shortness of breath while lying flat in bed. Please call your doctor as soon as you notice any of these symptoms; do not wait until your next office visit. The discharge information has been reviewed with the patient. The patient verbalized understanding. Discharge medications reviewed with the patient and appropriate educational materials and side effects teaching were provided. ___________________________________________________________________________________________________________________________________    MyChart Activation    Thank you for requesting access to [employer]. Please follow the instructions below to securely access and download your online medical record. [employer] allows you to send messages to your doctor, view your test results, renew your prescriptions, schedule appointments, and more. How Do I Sign Up? 1. In your internet browser, go to [email or web address].   2. Click on the First Time User? Click Here link in the Sign In box. You will see the New Member Sign Up page. 3. Enter your TruLeaf Access Code exactly as it appears below. You will not need to use this code after youve completed the sign-up process. If you do not sign up before the expiration date, you must request a new code. [employer] Access Code: Activation code not generated  Current TruLeaf Status: Active (This is the date your Cyber-Raint access code will )    4. Enter the last four digits of your Social Security Number (xxxx) and Date of Birth (mm/dd/yyyy) as indicated and click Submit. You will be taken to the next sign-up page. 5. Create a Cyber-Raint ID. This will be your TruLeaf login ID and cannot be changed, so think of one that is secure and easy to remember. 6. Create a TruLeaf password. You can change your password at any time. 7. Enter your Password Reset Question and Answer. This can be used at a later time if you forget your password. 8. Enter your e-mail address. You will receive e-mail notification when new information is available in [address] 9. Click Sign Up. You can now view and download portions of your medical record. 10. Click the Download Summary menu link to download a portable copy of your medical information. Additional Information    If you have questions, please visit the Frequently Asked Questions section of the TruLeaf website at [email or web address] Remember, TruLeaf is NOT to be used for urgent needs. For medical emergencies, dial 911.     Patient armband removed and shredded

## 2021-12-15 NOTE — Clinical Note
TRANSFER - OUT REPORT:     Ver[facility] Report consisted of patient's Situation, Background, Assessment and   Recommendations(SBAR). Opportunity for questions and clarification was provided.

## 2021-12-15 NOTE — Clinical Note
A Bovie was used. Coagulation Settin.  Cut Settin. Site (pad location): upper leg. Laterality: right.

## 2021-12-15 NOTE — Clinical Note
TRANSFER - IN REPORT:     Verbal report received from:  [address] [place] [address]. Report consisted of patient's Situation, Background, Assessment and   Recommendations(SBAR). Opportunity for questions and clarification was provided. Assessment completed upon patient's arrival to unit and care assumed. Patient transported with a Cardiac Cath Tech / Patient Care Tech.

## 2021-12-15 NOTE — ROUTINE PROCESS
A+Ox4, able to stand and transfer to wheelchair. Left upper chest dressing intact, no bleeding or swelling. Safety sling applied with instructions. Discharge information reviewed. Escorted to car tot his daughter for transport home. AVS Discharge instructions reviewed with patient and copy given to patient. All questions answered. Patient verbalized understanding to all discharge instructions. PIV removed. Procedural site within normal limits. No hematoma or bleeding noted from procedural and PIV site. No pain noted at discharge. Patient discharged with support person in stable condition. Escorted out to vehicle for transport home.

## 2021-12-15 NOTE — H&P
HISTORY OF PRESENT ILLNESS  Noe Lim is a 67 y.o. male.     Follow-up  Pertinent negatives include no chest pain, no abdominal pain, no headaches and no shortness of breath. Slow Heart Rate  Pertinent negatives include no chest pain, no abdominal pain, no headaches and no shortness of breath.      Patient presents for a followup office visit. He has a past medical history significant for asymptomatic bradycardia with heart rates in the upper 30s to low 50s. He also has a history of a previous CVA with residual right-sided deficit, history of recurrent DVTs, status post IVC filter, on chronic oral anticoagulation therapy. He last underwent an echocardiogram in February 2011 which showed normal LV size and systolic function. Grade 1 diastolic dysfunction, no significant valvular heart disease. He also underwent a 24-hour Holter monitor which did not show any prolonged pauses or episodes of high-grade AV block. Patient remains on Eliquis for oral anticoagulation. 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     The patient was last seen in our office approximately 7 to 8 months ago. At last visit, he was complaining of dizzy spells. He states he had 2 near syncopal episodes, so he wore a Holter monitor in April 2021 which was consistent with sick sinus syndrome. He had 2 prolonged pauses greater than 3.5 seconds in duration with the longest pause being 4.3 seconds. His average heart rate was 49 bpm.  A pacemaker was recommended at that time, however, the patient had already traveled to [place] to stay with his daughter where he lives for half the year. I did contact the patient's family and recommended pursuing a pacemaker there if he was having any concerning symptoms. Apparently, cardiology consultation was never sought out.   He states that while staying in [place] he did have a fall with his walker broke and gave way. He injured his left elbow, but did not sustain any head injuries. He denies having a leslie syncopal episode. He feels his dizzy spells are now more frequent than they have been in the past.  He denies any chest pain or shortness of breath. He does have chronic leg swelling which has not changed.          Past Medical History:   Diagnosis Date    Abnormal electrocardiogram      BPH (benign prostatic hyperplasia)      Bradycardia       Asymptomatic    Cardiac echocardiogram 02/14/2011     EF 60-65%. No RWMA. Gr 1 DDfx. Mild LAE. Atrial septal aneurysm. No R-L shunt. Mild MARLON.  Cardiac Holter monitor study 02/21/2011     Sinus rhythm, avg HR 48 bpm (range  bpm). Occasional single VE (bigeminy noted). Occasional SVT (single, pairs, multifocal). Brief irregular runs of SVT suggest A-fib.  Cardiac nuclear imaging test 06/02/2008     [address]:  No perfusion abnormalities. EF 49%. Neg EKG on pharm stress test.      Cardiovascular LE arterial testing 12/21/2009     No evidence of disease gisel.  CVA (cerebral infarction) 2000    Deep venous thrombosis (HCC) 10/22/2010     Recurrent    Diabetes mellitus (HCC)      Dyslipidemia      Glaucoma      High cholesterol      Nausea & vomiting      S/P insertion of IVC (inferior vena caval) filter      Sleep apnea       no cpap    Stroke [facility]) 2000             Current Outpatient Medications   Medication Sig Dispense Refill    pioglitazone (ACTOS) 30 mg tablet Take 30 mg by mouth two (2) times a day.        calcium polycarbophiL (Fiber Laxative, ca polycarbo,) 625 mg tablet Take 625 mg by mouth daily.        fluticasone (FLONASE) 50 mcg/actuation nasal spray 1 [place] by Both Nostrils route daily.        ACETAMINOPHEN/DIPHENHYDRAMINE (TYLENOL PM PO) Take 500 mg by mouth as needed ( pain and sleep aid as needed).         apixaban (ELIQUIS) 5 mg tablet Take 5 mg by mouth two (2) times a day.        SITAGLIPTIN PHOSPHATE (JANUVIA PO) Take 100 mg by mouth daily.        pravastatin (PRAVACHOL) 20 mg tablet Take 20 mg by mouth nightly.          tamsulosin (FLOMAX) 0.4 mg capsule Take 0.4 mg by mouth daily.          MULTIVITAMIN PO Take 1 Tab by mouth daily.           No Known Allergies      Social History            Tobacco Use    Smoking status: Never Smoker    Smokeless tobacco: Never Used   Substance Use Topics    Alcohol use: No       Comment: occasionally    Drug use: No            Family History   Problem Relation Age of Onset    Cancer Mother      Heart Disease Father              Review of Systems   Constitutional: Negative for chills, fever and weight loss. HENT: Negative for nosebleeds. Eyes: Negative for blurred vision and double vision. Respiratory: Negative for cough, shortness of breath and wheezing. Cardiovascular: Negative for chest pain, palpitations, orthopnea, claudication, leg swelling and PND. Gastrointestinal: Negative for abdominal pain, heartburn, nausea and vomiting. Genitourinary: Negative for dysuria and hematuria. Musculoskeletal: Negative for falls and myalgias. Skin: Negative for rash. Neurological: Positive for dizziness. Negative for focal weakness and headaches. Endo/Heme/Allergies: Does not bruise/bleed easily. Psychiatric/Behavioral: Negative for substance abuse.      Visit Vitals  /82 (BP 1 Location: Left upper arm, BP Patient Position: Sitting, BP Cuff Size: Small adult)   Pulse (!) 53   Ht 5' 7\" (1.702 m)   Wt 93.4 kg (206 lb)   SpO2 97%   BMI 32.26 kg/m²      Physical Exam  Constitutional:       Appearance: He is well-developed. HENT:      Head: Normocephalic and atraumatic. Eyes:      Conjunctiva/sclera: Conjunctivae normal.   Neck:      Vascular: No carotid bruit or JVD. Cardiovascular:      Rate and Rhythm: Regular rhythm. Bradycardia present. Pulses: Normal pulses. Heart sounds: S1 normal and S2 normal. No murmur heard. No gallop. No S3 sounds. Pulmonary:      Breath sounds: Normal breath sounds. No wheezing or rales. Abdominal:      General: Bowel sounds are normal.      Palpations: Abdomen is soft. Tenderness: There is no abdominal tenderness. Musculoskeletal:      Cervical back: Neck supple. Skin:     General: Skin is warm and dry. Neurological:      Mental Status: He is alert and oriented to person, place, and time.       EKG: Sinus bradycardia, left anterior fascicular block, nonspecific IVCD, Poor R-wave progression, no ST or T wave abnormalities. Compared to the previous EKG, no significant interval change.     ASSESSMENT and PLAN     Sick sinus syndrome. Historically, patient has been asymptomatic to his bradycardia, however, earlier this year he did report to severe dizzy spells with near syncope. He wore a Holter monitor in April 2021 which is consistent with sick sinus syndrome. His average heart was 49 bpm and he had 2 prolonged pauses greater than 3.5 seconds in duration. This is irreversible. I have recommended proceeding with implantation of a dual-chamber permanent pacemaker in the near future. This was discussed at length with the patient and his daughter. Shared decision making achieved. They are willing to proceed with the procedure when scheduled. He held his Eliquis for 48 hours prior to the procedure.     Dyslipidemia. Patient is now managed with pravastatin 20 mg daily. This is been followed by his PCP.     Diabetes mellitus, type II on oral agents also been managed by his primary care. Goal A1c should be less than 7.     Recurrent DVTs, now on Eliquis for anticoagulation and does have a IVC filter in place. As result he does have chronic lower extremity swelling. Brief interruption of anticoagulation as described above.     History of CVA, with residual right-sided deficit.   He is now on a statin and Eliquis for secondary prevention.

## 2021-12-15 NOTE — PROGRESS NOTES
1045: Cath holding summary     Patient escorted to cath holding from waiting area ambulatory, alert and oriented x 4, voicing no complaints. Changed into gown and placed on monitor. NPO since MN. Lab results, med rec and H&P reviewed on chart. PIV x 1 inserted without difficulty. Family to bedside. 1500: Verbal shift change report given to Bertha Gutierrez RN (oncoming nurse) by Kurtis Chávez RN (offgoing nurse). Report included the following information SBAR, Kardex, Intake/Output, MAR, Recent Results, Med Rec Status and Cardiac Rhythm Sinus Dioniciojorge Kennedy.

## 2021-12-15 NOTE — Clinical Note
Left chest and left neck clipped prepped with ChloraPrep and draped. Wet prep solution dried at: 3.  X 2

## 2021-12-23 ENCOUNTER — CLINICAL SUPPORT (OUTPATIENT)
Dept: CARDIOLOGY CLINIC | Age: 76
End: 2021-12-23
Payer: MEDICARE

## 2021-12-23 DIAGNOSIS — Z95.0 PACEMAKER: ICD-10-CM

## 2021-12-23 DIAGNOSIS — I49.5 SICK SINUS SYNDROME (HCC): Primary | ICD-10-CM

## 2021-12-23 PROCEDURE — 93280 PM DEVICE PROGR EVAL DUAL: CPT | Performed by: INTERNAL MEDICINE

## 2022-01-04 NOTE — PROGRESS NOTES
dual Pacemaker. 12 years left on battery. Lead impedance and threshold WNL. A paced 84 %, V sensed 100 %. 0 events.

## 2022-03-29 ENCOUNTER — OFFICE VISIT (OUTPATIENT)
Dept: CARDIOLOGY CLINIC | Age: 77
End: 2022-03-29
Payer: MEDICARE

## 2022-03-29 VITALS
BODY MASS INDEX: 31.71 KG/M2 | WEIGHT: 202 LBS | DIASTOLIC BLOOD PRESSURE: 88 MMHG | HEIGHT: 67 IN | OXYGEN SATURATION: 98 % | SYSTOLIC BLOOD PRESSURE: 138 MMHG | HEART RATE: 60 BPM

## 2022-03-29 DIAGNOSIS — I49.5 SICK SINUS SYNDROME (HCC): ICD-10-CM

## 2022-03-29 DIAGNOSIS — R94.31 ABNORMAL ELECTROCARDIOGRAM: Primary | ICD-10-CM

## 2022-03-29 DIAGNOSIS — I82.509 CHRONIC DEEP VEIN THROMBOSIS (DVT) OF LOWER EXTREMITY, UNSPECIFIED LATERALITY, UNSPECIFIED VEIN (HCC): ICD-10-CM

## 2022-03-29 DIAGNOSIS — E78.00 HYPERCHOLESTEROLEMIA: ICD-10-CM

## 2022-03-29 DIAGNOSIS — R42 DIZZINESS: ICD-10-CM

## 2022-03-29 DIAGNOSIS — Z95.0 PACEMAKER: ICD-10-CM

## 2022-03-29 PROCEDURE — 93000 ELECTROCARDIOGRAM COMPLETE: CPT | Performed by: INTERNAL MEDICINE

## 2022-03-29 PROCEDURE — 99214 OFFICE O/P EST MOD 30 MIN: CPT | Performed by: INTERNAL MEDICINE

## 2022-03-29 PROCEDURE — 1101F PT FALLS ASSESS-DOCD LE1/YR: CPT | Performed by: INTERNAL MEDICINE

## 2022-03-29 PROCEDURE — 93280 PM DEVICE PROGR EVAL DUAL: CPT | Performed by: INTERNAL MEDICINE

## 2022-03-29 PROCEDURE — G8510 SCR DEP NEG, NO PLAN REQD: HCPCS | Performed by: INTERNAL MEDICINE

## 2022-03-29 PROCEDURE — G8417 CALC BMI ABV UP PARAM F/U: HCPCS | Performed by: INTERNAL MEDICINE

## 2022-03-29 PROCEDURE — G8536 NO DOC ELDER MAL SCRN: HCPCS | Performed by: INTERNAL MEDICINE

## 2022-03-29 PROCEDURE — G8427 DOCREV CUR MEDS BY ELIG CLIN: HCPCS | Performed by: INTERNAL MEDICINE

## 2022-03-29 NOTE — PROGRESS NOTES
Kay Ambrocio presents today for   Chief Complaint   Patient presents with    Follow-up     4 month    Leg Swelling     both legs, feet, and ankles        Kay Ambrocio preferred language for health care discussion is english/other. Is someone accompanying this pt? yes    Is the patient using any DME equipment during [address]? walker    Depression Screening:  3 most recent PHQ Screens 3/29/2022   Little interest or pleasure in doing things Not at all   Feeling down, depressed, irritable, or hopeless Not at all   Total Score PHQ 2 0       Learning Assessment:  Learning Assessment 3/29/2022   PRIMARY LEARNER Patient   CO-LEARNER CAREGIVER -   PRIMARY LANGUAGE ENGLISH   LEARNER PREFERENCE PRIMARY DEMONSTRATION   ANSWERED BY patient   RELATIONSHIP SELF       Abuse Screening:  Abuse Screening Questionnaire 3/29/2022   Do you ever feel afraid of your partner? N   Are you in a relationship with someone who physically or mentally threatens you? N   Is it safe for you to go home? Y       Fall Risk  Fall Risk Assessment, last 12 mths 3/29/2022   Able to walk? Yes   Fall in past 12 months? 0   Do you feel unsteady? 0   Are you worried about falling 0           Pt currently taking Anticoagulant therapy? eliquis 5 mg bid    Pt currently taking Antiplatelet therapy ? no      Coordination of Care:  1. Have you been to the ER, urgent care clinic since your last visit? Hospitalized since your last visit? no    2. Have you seen or consulted any other health care providers outside of the [address] [place] [address] since your last visit? Include any pap smears or colon screening.  no

## 2022-03-30 NOTE — PROGRESS NOTES
HISTORY OF PRESENT ILLNESS  Kay Ambrocio is a 67 y.o. male. Follow-up  Pertinent negatives include no chest pain, no abdominal pain, no headaches and no shortness of breath. Leg Swelling  Pertinent negatives include no chest pain, no abdominal pain, no headaches and no shortness of breath. Patient presents for an overdue followup office visit. He has a past medical history significant for asymptomatic bradycardia with heart rates in the upper 30s to low 50s. He also has a history of a previous CVA with residual right-sided deficit, history of recurrent DVTs, status post IVC filter, on chronic oral anticoagulation therapy. He last underwent an echocardiogram in February 2011 which showed normal LV size and systolic function. Grade 1 diastolic dysfunction, no significant valvular heart disease. He also underwent a 24-hour Holter monitor which did not show any prolonged pauses or episodes of high-grade AV block. Patient remains on Eliquis for oral anticoagulation. 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 The patient had 2 near syncopal episodes, so he wore a Holter monitor in April 2021 which was consistent with sick sinus syndrome. He had 2 prolonged pauses greater than 3.5 seconds in duration with the longest pause being 4.3 seconds. His average heart rate was 49 bpm.  A pacemaker was recommended at that time, however, the patient had already traveled to [facility] to stay with his daughter where he lives for half the year. I did contact the patient's family and recommended pursuing a pacemaker there if he was having any concerning symptoms. Apparently, cardiology consultation was never sought out. As result, he underwent implantation of a dual chamber permanent pacemaker at the end of last year in December 2021.   Since his procedure, he denies any severe dizzy spells or recurrent syncope. Overall he feels like he has more energy. No fatigue on exertion. He still has chronic leg swelling which has not changed in severity. He denies any heart palpitations, orthopnea, or PND. No exertional dyspnea or chest pain. Past Medical History:   Diagnosis Date    Abnormal electrocardiogram     BPH (benign prostatic hyperplasia)     Bradycardia     Asymptomatic    Cardiac echocardiogram 02/14/2011    EF 60-65%. No RWMA. Gr 1 DDfx. Mild LAE. Atrial septal aneurysm. No R-L shunt. Mild MARLON.  Cardiac Holter monitor study 02/21/2011    Sinus rhythm, avg HR 48 bpm (range  bpm). Occasional single VE (bigeminy noted). Occasional SVT (single, pairs, multifocal). Brief irregular runs of SVT suggest A-fib.  Cardiac nuclear imaging test 06/02/2008    [facility], [address]:  No perfusion abnormalities. EF 49%. Neg EKG on pharm stress test.      Cardiovascular LE arterial testing 12/21/2009    No evidence of disease gisel.  CVA (cerebral infarction) 2000    Deep venous thrombosis (HCC) 10/22/2010    Recurrent    Diabetes mellitus (HCC)     Dyslipidemia     Glaucoma     High cholesterol     Nausea & vomiting     S/P insertion of IVC (inferior vena caval) filter     Sleep apnea     no cpap    Stroke [facility]) 2000      Current Outpatient Medications   Medication Sig Dispense Refill    pioglitazone (ACTOS) 30 mg tablet Take 30 mg by mouth two (2) times a day.  calcium polycarbophiL (Fiber Laxative, ca polycarbo,) 625 mg tablet Take 625 mg by mouth daily.  ACETAMINOPHEN/DIPHENHYDRAMINE (TYLENOL PM PO) Take 500 mg by mouth as needed ( pain and sleep aid as needed).  apixaban (ELIQUIS) 5 mg tablet Take 5 mg by mouth two (2) times a day.  SITAGLIPTIN PHOSPHATE (JANUVIA PO) Take 100 mg by mouth daily.  pravastatin (PRAVACHOL) 20 mg tablet Take 80 mg by mouth nightly.       tamsulosin (FLOMAX) 0.4 mg capsule Take 0.4 mg by mouth daily.  MULTIVITAMIN PO Take 1 Tab by mouth daily.  fluticasone (FLONASE) 50 mcg/actuation nasal spray 1 [place] by Both Nostrils route daily. (Patient not taking: Reported on 3/29/2022)         No Known Allergies     Social History     Tobacco Use    Smoking status: Never Smoker    Smokeless tobacco: Never Used   Vaping Use    Vaping Use: Never used   Substance Use Topics    Alcohol use: No     Comment: occasionally    Drug use: No     Family History   Problem Relation Age of Onset    Cancer Mother     Heart Disease Father          Review of Systems   Constitutional: Negative for chills, fever and weight loss. HENT: Negative for nosebleeds. Eyes: Negative for blurred vision and double vision. Respiratory: Negative for cough, shortness of breath and wheezing. Cardiovascular: Positive for leg swelling. Negative for chest pain, palpitations, orthopnea, claudication and PND. Gastrointestinal: Negative for abdominal pain, heartburn, nausea and vomiting. Genitourinary: Negative for dysuria and hematuria. Musculoskeletal: Negative for falls and myalgias. Skin: Negative for rash. Neurological: Negative for dizziness, focal weakness and headaches. Endo/Heme/Allergies: Does not bruise/bleed easily. Psychiatric/Behavioral: Negative for substance abuse. Visit Vitals  /88 (BP 1 Location: Left upper arm, BP Patient Position: Sitting, BP Cuff Size: Adult)   Pulse 60   Ht 5' 7\" (1.702 m)   Wt 91.6 kg (202 lb)   SpO2 98%   BMI 31.64 kg/m²      Physical Exam  Constitutional:       Appearance: He is well-developed. HENT:      Head: Normocephalic and atraumatic. Eyes:      Conjunctiva/sclera: Conjunctivae normal.   Neck:      Vascular: No carotid bruit or JVD. Cardiovascular:      Rate and Rhythm: Normal rate and regular rhythm. Pulses: Normal pulses. Heart sounds: S1 normal and S2 normal. No murmur heard. No gallop. No S3 sounds.        Comments: Left-sided pacemaker incision well-healed without erythema or induration. Pulmonary:      Breath sounds: Normal breath sounds. No wheezing or rales. Abdominal:      General: Bowel sounds are normal.      Palpations: Abdomen is soft. Tenderness: There is no abdominal tenderness. Musculoskeletal:      Cervical back: Neck supple. Skin:     General: Skin is warm and dry. Neurological:      Mental Status: He is alert and oriented to person, place, and time. EK% atrial paced rhythm. Prolonged AV conduction, intrinsic QRS demonstrates a nonspecific IVCD. Compared to the previous EKG, atrial paced rhythm is now present. Pacemaker interrogation: Normal functioning Medtronic MRI compatible dual-chamber pacemaker. Battery life estimated at 13 years. Pacing in the atrium 93% in the ventricle 0%. No significant arrhythmias. Scanned document for details. ASSESSMENT and PLAN    Abnormal EKG. Patient does have a chronic interventricular conduction delay on his EKG which is unchanged today. Sick sinus syndrome. Patient underwent implantation of an MRI compatible dual-chamber permanent pacemaker in 2021. Normal device function on interrogation today. He is pacing primarily in the atrium and 0% in the ventricle. His lower rate is set at 55. He is 13 years left on his battery life. Dyslipidemia. Patient is now managed with pravastatin 20 mg daily. This is been followed by his PCP. Diabetes mellitus, type II on oral agents also been managed by his primary care. Goal A1c should be less than 7. Recurrent DVTs, now on Eliquis for anticoagulation and does have a IVC filter in place. As result he does have chronic lower extremity swelling. No significant change in swelling since last visit. History of CVA, with residual right-sided deficit. He is now on a statin and Eliquis for secondary prevention. [facility] device check to be scheduled in 3 months.   Follow-up in our office in 7 months when he returns from [place], [place].

## 2022-07-06 ENCOUNTER — OFFICE VISIT (OUTPATIENT)
Dept: CARDIOLOGY CLINIC | Age: 77
End: 2022-07-06
Payer: MEDICARE

## 2022-07-06 DIAGNOSIS — I49.5 SICK SINUS SYNDROME (HCC): Primary | ICD-10-CM

## 2022-07-06 DIAGNOSIS — Z95.0 PACEMAKER: ICD-10-CM

## 2022-07-06 PROCEDURE — 93296 REM INTERROG EVL PM/IDS: CPT | Performed by: INTERNAL MEDICINE

## 2022-07-06 PROCEDURE — 93294 REM INTERROG EVL PM/LDLS PM: CPT | Performed by: INTERNAL MEDICINE

## 2022-08-04 NOTE — PROGRESS NOTES
I have personally seen and evaluated the device findings. Interrogation reviewed and I agree with assessment.     Snehal Álvarez

## 2022-12-13 ENCOUNTER — OFFICE VISIT (OUTPATIENT)
Dept: CARDIOLOGY CLINIC | Age: 77
End: 2022-12-13
Payer: MEDICARE

## 2022-12-13 VITALS
HEIGHT: 67 IN | WEIGHT: 210 LBS | SYSTOLIC BLOOD PRESSURE: 122 MMHG | OXYGEN SATURATION: 98 % | HEART RATE: 61 BPM | DIASTOLIC BLOOD PRESSURE: 70 MMHG | BODY MASS INDEX: 32.96 KG/M2

## 2022-12-13 DIAGNOSIS — E78.00 HYPERCHOLESTEROLEMIA: ICD-10-CM

## 2022-12-13 DIAGNOSIS — I48.0 PAROXYSMAL ATRIAL FIBRILLATION (HCC): ICD-10-CM

## 2022-12-13 DIAGNOSIS — I49.5 SICK SINUS SYNDROME (HCC): ICD-10-CM

## 2022-12-13 DIAGNOSIS — R06.02 SOB (SHORTNESS OF BREATH): Primary | ICD-10-CM

## 2022-12-13 DIAGNOSIS — Z95.0 PACEMAKER: ICD-10-CM

## 2022-12-13 DIAGNOSIS — I82.509 CHRONIC DEEP VEIN THROMBOSIS (DVT) OF LOWER EXTREMITY, UNSPECIFIED LATERALITY, UNSPECIFIED VEIN (HCC): ICD-10-CM

## 2022-12-13 RX ORDER — FUROSEMIDE 20 MG/1
20 TABLET ORAL DAILY
Qty: 30 TABLET | Refills: 6 | Status: SHIPPED | OUTPATIENT
Start: 2022-12-13

## 2022-12-13 NOTE — PROGRESS NOTES
Junior Kim presents today for   Chief Complaint   Patient presents with    Follow-up     Overdue follow up - Last seen on 03-29-22    Vikas Melton preferred language for health care discussion is english/other. Is someone accompanying this pt? yes    Is the patient using any DME equipment during [address]? walker    Depression Screening:  3 most recent PHQ Screens 12/13/2022   Little interest or pleasure in doing things Not at all   Feeling down, depressed, irritable, or hopeless Not at all   Total Score PHQ 2 0       Learning Assessment:  Learning Assessment 12/13/2022   PRIMARY LEARNER Patient   CO-LEARNER CAREGIVER -   PRIMARY LANGUAGE ENGLISH   LEARNER PREFERENCE PRIMARY DEMONSTRATION   ANSWERED BY patient   RELATIONSHIP SELF       Abuse Screening:  Abuse Screening Questionnaire 12/13/2022   Do you ever feel afraid of your partner? N   Are you in a relationship with someone who physically or mentally threatens you? N   Is it safe for you to go home? Y       Fall Risk  Fall Risk Assessment, last 12 mths 12/13/2022   Able to walk? Yes   Fall in past 12 months? 0   Do you feel unsteady? 0   Are you worried about falling 0           Pt currently taking Anticoagulant therapy? no    Pt currently taking Antiplatelet therapy ? no      Coordination of Care:  1. Have you been to the ER, urgent care clinic since your last visit? Hospitalized since your last visit? no    2. Have you seen or consulted any other health care providers outside of the [address] [place] [address] since your last visit? Include any pap smears or colon screening.  no

## 2022-12-13 NOTE — PROGRESS NOTES
HISTORY OF PRESENT ILLNESS  Penelope De Leon is a 65 y.o. male. Follow-up  Associated symptoms include shortness of breath. Pertinent negatives include no chest pain, no abdominal pain and no headaches. Leg Swelling  Associated symptoms include shortness of breath. Pertinent negatives include no chest pain, no abdominal pain and no headaches. Pacemaker Check  Associated symptoms include shortness of breath. Pertinent negatives include no chest pain, no abdominal pain and no headaches. Patient presents for a followup office visit. He has a past medical history significant for asymptomatic bradycardia with heart rates in the upper 30s to low 50s. He also has a history of a previous CVA with residual right-sided deficit, history of recurrent DVTs, status post IVC filter, on chronic oral anticoagulation therapy. He last underwent an echocardiogram in February 2011 which showed normal LV size and systolic function. Grade 1 diastolic dysfunction, no significant valvular heart disease. He also underwent a 24-hour Holter monitor which did not show any prolonged pauses or episodes of high-grade AV block. Patient remains on Eliquis for oral anticoagulation. 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 The patient had 2 near syncopal episodes, so he wore a Holter monitor in April 2021 which was consistent with sick sinus syndrome. He had 2 prolonged pauses greater than 3.5 seconds in duration with the longest pause being 4.3 seconds. Eventually underwent implantation of a dual chamber permanent pacemaker in December 2021. Since his procedure, he denies any severe dizzy spells or recurrent syncope. He was last seen in our office 8 to 9 months ago.   Since last visit, he has noted worsening shortness of breath with and without activity and some orthopnea over the past month or 2. He continues to have chronic leg swelling which may be a little worse than baseline. He denies any chest pain, heart palpitations, dizziness, nor syncope. No increased fatigue. Past Medical History:   Diagnosis Date    Abnormal electrocardiogram     BPH (benign prostatic hyperplasia)     Bradycardia     Asymptomatic    Cardiac echocardiogram 02/14/2011    EF 60-65%. No RWMA. Gr 1 DDfx. Mild LAE. Atrial septal aneurysm. No R-L shunt. Mild MARLON. Cardiac Holter monitor study 02/21/2011    Sinus rhythm, avg HR 48 bpm (range  bpm). Occasional single VE (bigeminy noted). Occasional SVT (single, pairs, multifocal). Brief irregular runs of SVT suggest A-fib. Cardiac nuclear imaging test 06/02/2008    [facility], [address]:  No perfusion abnormalities. EF 49%. Neg EKG on pharm stress test.      Cardiovascular LE arterial testing 12/21/2009    No evidence of disease gisel. CVA (cerebral infarction) 2000    Deep venous thrombosis ([facility] [address] 10/22/2010    Recurrent    Diabetes mellitus ([facility] [address]     Dyslipidemia     Glaucoma     High cholesterol     Nausea & vomiting     S/P insertion of IVC (inferior vena caval) filter     Sleep apnea     no cpap    Stroke (HCC) 2000      Current Outpatient Medications   Medication Sig Dispense Refill    furosemide (LASIX) 20 mg tablet Take 1 Tablet by mouth daily. 30 Tablet 6    pioglitazone (ACTOS) 30 mg tablet Take 30 mg by mouth two (2) times a day. calcium polycarbophiL (FIBERCON) 625 mg tablet Take 625 mg by mouth daily. fluticasone (FLONASE) 50 mcg/actuation nasal spray 1 [place] by Both Nostrils route daily. ACETAMINOPHEN/DIPHENHYDRAMINE (TYLENOL PM PO) Take 500 mg by mouth as needed ( pain and sleep aid as needed). apixaban (ELIQUIS) 5 mg tablet Take 5 mg by mouth two (2) times a day. SITAGLIPTIN PHOSPHATE (JANUVIA PO) Take 100 mg by mouth daily.       pravastatin (PRAVACHOL) 20 mg tablet Take 80 mg by mouth nightly. tamsulosin (FLOMAX) 0.4 mg capsule Take 0.4 mg by mouth daily. MULTIVITAMIN PO Take 1 Tab by mouth daily. No Known Allergies     Social History     Tobacco Use    Smoking status: Never    Smokeless tobacco: Never   Vaping Use    Vaping Use: Never used   Substance Use Topics    Alcohol use: No     Comment: occasionally    Drug use: No     Family History   Problem Relation Age of Onset    Cancer Mother     Heart Disease Father          Review of Systems   Constitutional:  Negative for chills, fever and weight loss. HENT:  Negative for nosebleeds. Eyes:  Negative for blurred vision and double vision. Respiratory:  Positive for shortness of breath. Negative for cough and wheezing. Cardiovascular:  Positive for orthopnea and leg swelling. Negative for chest pain, palpitations, claudication and PND. Gastrointestinal:  Negative for abdominal pain, heartburn, nausea and vomiting. Genitourinary:  Negative for dysuria and hematuria. Musculoskeletal:  Negative for falls and myalgias. Skin:  Negative for rash. Neurological:  Negative for dizziness, focal weakness and headaches. Endo/Heme/Allergies:  Does not bruise/bleed easily. Psychiatric/Behavioral:  Negative for substance abuse. Visit Vitals  /70 (BP 1 Location: Left upper arm, BP Patient Position: Sitting, BP Cuff Size: Adult)   Pulse 61   Ht 5' 7\" (1.702 m)   Wt 95.3 kg (210 lb)   SpO2 98%   BMI 32.89 kg/m²      Physical Exam  Constitutional:       Appearance: He is well-developed. HENT:      Head: Normocephalic and atraumatic. Eyes:      Conjunctiva/sclera: Conjunctivae normal.   Neck:      Vascular: No carotid bruit or JVD. Cardiovascular:      Rate and Rhythm: Normal rate and regular rhythm. Pulses: Normal pulses. Heart sounds: S1 normal and S2 normal. No murmur heard. No gallop. No S3 sounds.       Comments: Left-sided pacemaker incision well-healed without erythema or induration. Pulmonary:      Breath sounds: Normal breath sounds. No wheezing or rales. Abdominal:      General: Bowel sounds are normal.      Palpations: Abdomen is soft. Tenderness: There is no abdominal tenderness. Musculoskeletal:         General: Swelling (2+ bilateral lower extremity, right greater than left) present. Cervical back: Neck supple. Skin:     General: Skin is warm and dry. Neurological:      Mental Status: He is alert and oriented to person, place, and time. EK% atrial paced rhythm. Prolonged AV conduction, intrinsic QRS demonstrates a nonspecific IVCD. Compared to the previous EKG, no significant change. Pacemaker interrogation: Normal functioning Medtronic MRI compatible dual-chamber pacemaker. Battery life estimated at 12.8 years. Pacing in the atrium 85 % in the ventricle 0%. Several episodes of atrial fibrillation over the past 4 months the longest episode lasting for 4-1/2 hours. No high rate episodes. Scanned document for details. ASSESSMENT and PLAN    Shortness of breath/orthopnea. This is concerning for new onset heart failure with likely preserved ejection fraction. His weight is up 8 pounds and his leg swelling appears to be somewhat worse than baseline. I have recommend starting furosemide 20 mg daily and repeat an echocardiogram to reevaluate his LV function. He may be a good candidate for an SGLT2 inhibitor    Paroxysmal atrial fibrillation. New finding on pacemaker check today. He has had several episodes over the past 4 months, longest lasting for 4 hours. He is asymptomatic. He is already taking oral anticoagulation for his history of recurrent DVTs. Sick sinus syndrome. Patient underwent implantation of an MRI compatible dual-chamber permanent pacemaker in 2021. Normal device function on interrogation today. He is pacing primarily in the atrium and 0% in the ventricle. His lower rate is set at 55.   Battery remains at beginning of life. Dyslipidemia. Patient is now managed with pravastatin 20 mg daily. This is been followed by his PCP. Diabetes mellitus, type II on oral agents also been managed by his primary care. Goal A1c should be less than 7. From a cardiac standpoint, we will consider switching his Actos over to an SGLT2 inhibitor such as Jardiance. Recurrent DVTs, now on Eliquis for anticoagulation and does have a IVC filter in place. As result he does have chronic lower extremity swelling. No significant change in swelling since last visit. History of CVA, with residual right-sided deficit. He is now on a statin and Eliquis for secondary prevention. Now that he is demonstrating atrial fibrillation he will need to stay on lifelong oral anticoagulation. Follow-up in 2 months, sooner if needed.

## 2023-01-23 ENCOUNTER — TELEPHONE (OUTPATIENT)
Dept: CARDIOLOGY CLINIC | Age: 78
End: 2023-01-23

## 2023-01-23 NOTE — TELEPHONE ENCOUNTER
----- Message from Cleo Cabrera MD sent at 1/23/2023 10:35 AM EST -----  Please let the patient know that his echocardiogram was unchanged from his previous study. ----- Message -----  From: Ascencion Long LPN  Sent: [id number]  10:23 AM EST  To: Cleo Cabrera MD    Per your note - Shortness of breath/orthopnea. This is concerning for new onset heart failure with likely preserved ejection fraction. His weight is up 8 pounds and his leg swelling appears to be somewhat worse than baseline. I have recommend starting furosemide 20 mg daily and repeat an echocardiogram to reevaluate his LV function.   He may be a good candidate for an SGLT2 inhibitor

## 2023-01-23 NOTE — TELEPHONE ENCOUNTER
Patient's daughter made aware of echo results and Dr. Moody Young remarks. No questions or concerns at present.

## 2023-02-28 ENCOUNTER — OFFICE VISIT (OUTPATIENT)
Age: 78
End: 2023-02-28
Payer: MEDICARE

## 2023-02-28 VITALS
BODY MASS INDEX: 34.32 KG/M2 | DIASTOLIC BLOOD PRESSURE: 80 MMHG | HEIGHT: 65 IN | OXYGEN SATURATION: 95 % | SYSTOLIC BLOOD PRESSURE: 122 MMHG | WEIGHT: 206 LBS | HEART RATE: 56 BPM

## 2023-02-28 DIAGNOSIS — Z95.0 PRESENCE OF CARDIAC PACEMAKER: ICD-10-CM

## 2023-02-28 DIAGNOSIS — I48.0 PAROXYSMAL ATRIAL FIBRILLATION (HCC): ICD-10-CM

## 2023-02-28 DIAGNOSIS — I50.32 CHRONIC HEART FAILURE WITH PRESERVED EJECTION FRACTION (HCC): Primary | ICD-10-CM

## 2023-02-28 DIAGNOSIS — I82.509 CHRONIC THROMBOEMBOLISM OF DEEP VEIN OF LOWER EXTREMITY, UNSPECIFIED LATERALITY (HCC): ICD-10-CM

## 2023-02-28 DIAGNOSIS — E78.00 PURE HYPERCHOLESTEROLEMIA, UNSPECIFIED: ICD-10-CM

## 2023-02-28 DIAGNOSIS — I49.5 SICK SINUS SYNDROME (HCC): ICD-10-CM

## 2023-02-28 PROBLEM — I63.9 CEREBRAL INFARCTION (HCC): Status: ACTIVE | Noted: 2023-02-28

## 2023-02-28 PROBLEM — G47.33 OSA (OBSTRUCTIVE SLEEP APNEA): Status: ACTIVE | Noted: 2023-02-28

## 2023-02-28 PROBLEM — I82.409 DVT (DEEP VENOUS THROMBOSIS) (HCC): Status: ACTIVE | Noted: 2023-02-28

## 2023-02-28 PROCEDURE — G8427 DOCREV CUR MEDS BY ELIG CLIN: HCPCS | Performed by: INTERNAL MEDICINE

## 2023-02-28 PROCEDURE — 93000 ELECTROCARDIOGRAM COMPLETE: CPT | Performed by: INTERNAL MEDICINE

## 2023-02-28 PROCEDURE — 1036F TOBACCO NON-USER: CPT | Performed by: INTERNAL MEDICINE

## 2023-02-28 PROCEDURE — G8484 FLU IMMUNIZE NO ADMIN: HCPCS | Performed by: INTERNAL MEDICINE

## 2023-02-28 PROCEDURE — 1123F ACP DISCUSS/DSCN MKR DOCD: CPT | Performed by: INTERNAL MEDICINE

## 2023-02-28 PROCEDURE — G8417 CALC BMI ABV UP PARAM F/U: HCPCS | Performed by: INTERNAL MEDICINE

## 2023-02-28 PROCEDURE — 99214 OFFICE O/P EST MOD 30 MIN: CPT | Performed by: INTERNAL MEDICINE

## 2023-02-28 ASSESSMENT — PATIENT HEALTH QUESTIONNAIRE - PHQ9
SUM OF ALL RESPONSES TO PHQ QUESTIONS 1-9: 0
SUM OF ALL RESPONSES TO PHQ9 QUESTIONS 1 & 2: 0
SUM OF ALL RESPONSES TO PHQ QUESTIONS 1-9: 0
2. FEELING DOWN, DEPRESSED OR HOPELESS: 0
1. LITTLE INTEREST OR PLEASURE IN DOING THINGS: 0
SUM OF ALL RESPONSES TO PHQ QUESTIONS 1-9: 0
SUM OF ALL RESPONSES TO PHQ QUESTIONS 1-9: 0

## 2023-02-28 ASSESSMENT — ANXIETY QUESTIONNAIRES
4. TROUBLE RELAXING: 0
3. WORRYING TOO MUCH ABOUT DIFFERENT THINGS: 0
6. BECOMING EASILY ANNOYED OR IRRITABLE: 0
5. BEING SO RESTLESS THAT IT IS HARD TO SIT STILL: 0
1. FEELING NERVOUS, ANXIOUS, OR ON EDGE: 0
7. FEELING AFRAID AS IF SOMETHING AWFUL MIGHT HAPPEN: 0
GAD7 TOTAL SCORE: 0
2. NOT BEING ABLE TO STOP OR CONTROL WORRYING: 0

## 2023-02-28 ASSESSMENT — ENCOUNTER SYMPTOMS
ABDOMINAL PAIN: 0
SHORTNESS OF BREATH: 0
COUGH: 0
NAUSEA: 0
ABDOMINAL DISTENTION: 0
SORE THROAT: 0
VOMITING: 0

## 2023-02-28 NOTE — PROGRESS NOTES
Rolanda Riley presents today for   Chief Complaint   Patient presents with    Follow-up     2 month       Rolanda Riley preferred language for health care discussion is english/other. Is someone accompanying this pt? yes    Is the patient using any DME equipment during OV? walker    Depression Screening:  Depression: Not at risk    PHQ-2 Score: 0        Learning Assessment:  Who is the primary learner? Patient    What is the preferred language for health care of the primary learner? ENGLISH    How does the primary learner prefer to learn new concepts? DEMONSTRATION    Answered By patient    Relationship to Learner SELF           Pt currently taking Anticoagulant therapy? Eliquis 5 mg bid    Pt currently taking Antiplatelet therapy ? no      Coordination of Care:  1. Have you been to the ER, urgent care clinic since your last visit? Hospitalized since your last visit? no    2. Have you seen or consulted any other health care providers outside of the [address] [place] [address] since your last visit? Include any pap smears or colon screening.  no

## 2023-02-28 NOTE — PROGRESS NOTES
02/28/23     Kishor Pena  is a 65 y.o. male     Chief Complaint   Patient presents with    Follow-up     2 month       HPI    Patient presents for a followup office visit. He has a past medical history significant for asymptomatic bradycardia with heart rates in the upper 30s to low 50s. He also has a history of a previous CVA with residual right-sided deficit, history of recurrent DVTs, status post IVC filter, on chronic oral anticoagulation therapy. He last underwent an echocardiogram in February 2011 which showed normal LV size and systolic function. Grade 1 diastolic dysfunction, no significant valvular heart disease. He also underwent a 24-hour Holter monitor which did not show any prolonged pauses or episodes of high-grade AV block. Patient remains on Eliquis for oral anticoagulation. 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 The patient had 2 near syncopal episodes, so he wore a Holter monitor in April 2021 which was consistent with sick sinus syndrome. He had 2 prolonged pauses greater than 3.5 seconds in duration with the longest pause being 4.3 seconds. Eventually underwent implantation of a dual chamber permanent pacemaker in December 2021. Since his procedure, he denies any severe dizzy spells or recurrent syncope. He was last seen in our office 2-3 months ago. At last visit he appeared to be volume overloaded complaining of increased leg swelling and shortness of breath. As result he was started on furosemide 20 mg daily. An echocardiogram was performed in January 2023 which demonstrated preserved LV function, EF 55 to 60%, mild concentric LVH, mild to moderate aortic insufficiency with a mildly dilated aortic root. He feels his symptoms have somewhat improved since last visit.   He has not noted any orthopnea or worsening shortness of breath. His leg swelling is better as well. He is lost a few pounds in weight. Past Medical History:   Diagnosis Date    Abnormal electrocardiogram     Arthritis ? BPH (benign prostatic hyperplasia)     Bradycardia     Asymptomatic    CVA (cerebral infarction) 2000    Deep venous thrombosis ([facility] 10/22/2010    Recurrent    Diabetes mellitus ([facility]     Dyslipidemia     Glaucoma     High cholesterol     History of echocardiogram 02/14/2011    EF 60-65%. No RWMA. Gr 1 DDfx. Mild LAE. Atrial septal aneurysm. No R-L shunt. Mild COLE. History of Holter monitoring 02/21/2011    Sinus rhythm, avg HR 48 bpm (range  bpm). Occasional single VE (bigeminy noted). Occasional SVT (single, pairs, multifocal). Brief irregular runs of SVT suggest A-fib. History of myocardial perfusion scan 06/02/2008    [facility], [address]:  No perfusion abnormalities. EF 49%. Neg EKG on pharm stress test.      Nausea & vomiting     Peripheral arterial disease ([facility] 12/21/2009    No evidence of disease michelle. S/P insertion of IVC (inferior vena caval) filter     Sleep apnea     no cpap    Stroke ([facility]) 2000     Current Outpatient Medications   Medication Sig Dispense Refill    diphenhydrAMINE-APAP, sleep, (TYLENOL PM EXTRA STRENGTH PO) Take by mouth as needed      JANUVIA 100 MG tablet take 1 tablet by mouth once daily      Multiple Vitamin (MULTIVITAMIN PO) Take 1 tablet by mouth daily      apixaban (ELIQUIS) 5 MG TABS tablet Take 5 mg by mouth 2 times daily      fluticasone (FLONASE) 50 MCG/ACT nasal spray 1 spray by Nasal route daily      furosemide (LASIX) 20 MG tablet Take 20 mg by mouth daily      pioglitazone (ACTOS) 30 MG tablet Take 30 mg by mouth 2 times daily      tamsulosin (FLOMAX) 0.4 MG capsule Take 0.4 mg by mouth daily      pravastatin (PRAVACHOL) 20 MG tablet Take 80 mg by mouth       No current facility-administered medications for this visit.      No Known Allergies  Social History     Tobacco Use    Smoking status: Never    Smokeless tobacco: Never   Vaping Use    Vaping Use: Never used   Substance Use Topics    Alcohol use: No    Drug use: No     Family History   Problem Relation Age of Onset    Diabetes Brother     Diabetes Brother     Heart Disease Father     Diabetes Mother     Cancer Mother          Review of Systems   Constitutional:  Negative for chills, fatigue and fever. HENT:  Negative for congestion, hearing loss, nosebleeds and sore throat. Eyes:  Negative for visual disturbance. Respiratory:  Negative for cough and shortness of breath. Cardiovascular:  Positive for leg swelling. Negative for chest pain and palpitations. Gastrointestinal:  Negative for abdominal distention, abdominal pain, nausea and vomiting. Endocrine: Negative for cold intolerance and heat intolerance. Genitourinary:  Negative for dysuria. Musculoskeletal:  Negative for arthralgias. Skin:  Negative for rash. Neurological:  Negative for dizziness, syncope, weakness and headaches. Hematological:  Does not bruise/bleed easily. Psychiatric/Behavioral:  Negative for suicidal ideas. /80 (Site: Left Upper Arm, Position: Sitting, Cuff Size: Medium Adult)   Pulse 56   Ht 5' 5\" (1.651 m)   Wt 206 lb (93.4 kg)   SpO2 95%   BMI 34.28 kg/m²     Objective:   Physical Exam  Constitutional:       General: He is not in acute distress. HENT:      Head: Normocephalic. Neck:      Vascular: No carotid bruit or JVD. Cardiovascular:      Rate and Rhythm: Normal rate and regular rhythm. No extrasystoles are present. Heart sounds: No murmur heard. No gallop. Pulmonary:      Effort: Pulmonary effort is normal.      Breath sounds: No wheezing, rhonchi or rales. Abdominal:      General: Bowel sounds are normal. There is no distension. Palpations: Abdomen is soft. Tenderness: There is no abdominal tenderness.    Musculoskeletal:         General: Swelling (1+ left lower extremity, trace on the right lower extremity) present. No deformity. Skin:     General: Skin is warm and dry. Findings: No rash. Neurological:      General: No focal deficit present. Mental Status: He is alert and oriented to person, place, and time. Psychiatric:         Mood and Affect: Mood normal.         Behavior: Behavior normal.       EKG: Atrial paced rhythm, prolonged TX conduction, intrinsic QRS with a nonspecific IVCD and left axis deviation. Nonspecific ST abnormality. No change compared to the previous EKG. Pacemaker interrogation: Normal functioning Medtronic dual-chamber MRI compatible device. Battery beginning of life estimated longevity greater than 12 years. Pacing in the atrium 89% in the ventricle 0%. No significant arrhythmias since last device check. Scanned document for details. Assessment / Plan:   Chronic heart failure with preserved ejection fraction. Patient's volume status has improved with the addition of low-dose furosemide. A follow-up echocardiogram in January 2023 demonstrated preserved LV function, EF 55 to 60%. No significant valvular heart disease. For now we will continue his low-dose diuretic therapy. In the future, he would benefit from SGLT2 inhibitor as well. Paroxysmal atrial fibrillation. New finding on pacemaker check today. No recurrent episodes over the past 3 months. He did have several episodes earlier in 2022 for which she was asymptomatic. He is already taking oral anticoagulation for his history of recurrent DVTs. I would continue his current anticoagulation. Sick sinus syndrome. Patient underwent implantation of an MRI compatible dual-chamber permanent pacemaker in December 2021. Normal device function on interrogation today. He is pacing primarily in the atrium and 0% in the ventricle. His lower rate is set at 55. Battery remains at beginning of life. Dyslipidemia.   Patient is now managed with pravastatin 20 mg daily. This is been followed by his PCP. Diabetes mellitus, type II on oral agents also been managed by his primary care. Goal A1c should be less than 7. This is managed by his PCP. Recurrent DVTs, now on Eliquis for anticoagulation and does have a IVC filter in place. His leg swelling has improved with the addition of furosemide. History of CVA, with residual right-sided deficit. He is now on a statin and Eliquis for secondary prevention. Now that he is demonstrating atrial fibrillation he will need to stay on lifelong oral anticoagulation. [facility] device check in 3 months. Follow-up in 6 months, sooner if needed.       Oliva Osuna MD

## 2023-05-31 ENCOUNTER — OFFICE VISIT (OUTPATIENT)
Age: 78
End: 2023-05-31

## 2023-05-31 DIAGNOSIS — I49.5 SICK SINUS SYNDROME (HCC): Primary | ICD-10-CM

## 2023-05-31 DIAGNOSIS — Z95.0 PRESENCE OF CARDIAC PACEMAKER: ICD-10-CM

## 2023-06-01 NOTE — PROGRESS NOTES
[facility]:  dual Pacemaker. 12.2 years left on battery. Lead impedance and threshold WNL. A paced 81 %, V sensed 99 %. 2 nonsustained VT , lasting 1sec, and 6 atrial fib episodes, patient is on eliquis. Longest lasting 3 hours long.

## 2023-08-29 ENCOUNTER — OFFICE VISIT (OUTPATIENT)
Age: 78
End: 2023-08-29

## 2023-08-29 VITALS
SYSTOLIC BLOOD PRESSURE: 136 MMHG | WEIGHT: 193 LBS | DIASTOLIC BLOOD PRESSURE: 74 MMHG | HEIGHT: 65 IN | OXYGEN SATURATION: 98 % | HEART RATE: 58 BPM | BODY MASS INDEX: 32.15 KG/M2

## 2023-08-29 DIAGNOSIS — Z95.0 PRESENCE OF CARDIAC PACEMAKER: ICD-10-CM

## 2023-08-29 DIAGNOSIS — I50.32 CHRONIC HEART FAILURE WITH PRESERVED EJECTION FRACTION (HCC): ICD-10-CM

## 2023-08-29 DIAGNOSIS — I82.509 CHRONIC THROMBOEMBOLISM OF DEEP VEIN OF LOWER EXTREMITY, UNSPECIFIED LATERALITY (HCC): ICD-10-CM

## 2023-08-29 DIAGNOSIS — E78.00 PURE HYPERCHOLESTEROLEMIA, UNSPECIFIED: ICD-10-CM

## 2023-08-29 DIAGNOSIS — I48.0 PAROXYSMAL ATRIAL FIBRILLATION (HCC): Primary | ICD-10-CM

## 2023-08-29 DIAGNOSIS — I49.5 SICK SINUS SYNDROME (HCC): ICD-10-CM

## 2023-08-29 RX ORDER — PRAVASTATIN SODIUM 40 MG
40 TABLET ORAL DAILY
COMMUNITY
Start: 2023-08-17

## 2023-08-29 ASSESSMENT — PATIENT HEALTH QUESTIONNAIRE - PHQ9
SUM OF ALL RESPONSES TO PHQ9 QUESTIONS 1 & 2: 0
SUM OF ALL RESPONSES TO PHQ QUESTIONS 1-9: 0
1. LITTLE INTEREST OR PLEASURE IN DOING THINGS: 0
SUM OF ALL RESPONSES TO PHQ QUESTIONS 1-9: 0
SUM OF ALL RESPONSES TO PHQ QUESTIONS 1-9: 0
2. FEELING DOWN, DEPRESSED OR HOPELESS: 0
SUM OF ALL RESPONSES TO PHQ QUESTIONS 1-9: 0

## 2023-08-29 ASSESSMENT — ENCOUNTER SYMPTOMS
VOMITING: 0
COUGH: 0
ABDOMINAL DISTENTION: 0
NAUSEA: 0
SHORTNESS OF BREATH: 0
ABDOMINAL PAIN: 0
SORE THROAT: 0

## 2023-08-29 NOTE — PROGRESS NOTES
Michelle Davis presents today for   Chief Complaint   Patient presents with    Follow-up     6 month follow up    Device Check     Medtronic, Vpaced : <0.1%, Dual Chamber Pacer       Michelle Lenyamini preferred language for health care discussion is english/other. Is someone accompanying this pt? yes    Is the patient using any DME equipment during OV? walker    Depression Screening:  Depression: Not at risk    PHQ-2 Score: 0        Learning Assessment:  Who is the primary learner? Patient    What is the preferred language for health care of the primary learner? ENGLISH    How does the primary learner prefer to learn new concepts? DEMONSTRATION    Answered By patient    Relationship to Learner SELF           Pt currently taking Anticoagulant therapy? Eliquis 5 mg twice a day    Pt currently taking Antiplatelet therapy ? no      Coordination of Care:  1. Have you been to the ER, urgent care clinic since your last visit? Hospitalized since your last visit? no    2. Have you seen or consulted any other health care providers outside of the [address] [place] [address] since your last visit? Include any pap smears or colon screening.  no

## 2023-08-29 NOTE — PROGRESS NOTES
08/29/23     Kaity Chan  is a 77 y.o. male     Chief Complaint   Patient presents with    Follow-up     6 month follow up    Device Check     Medtronic, Vpaced : <0.1%, Dual Chamber Pacer       HPI  Patient presents for a followup office visit. He has a past medical history significant for asymptomatic bradycardia with heart rates in the upper 30s to low 50s. He also has a history of a previous CVA with residual right-sided deficit, history of recurrent DVTs, status post IVC filter, on chronic oral anticoagulation therapy. He last underwent an echocardiogram in February 2011 which showed normal LV size and systolic function. Grade 1 diastolic dysfunction, no significant valvular heart disease. He also underwent a 24-hour Holter monitor which did not show any prolonged pauses or episodes of high-grade AV block. Patient remains on Eliquis for oral anticoagulation. 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 The patient had 2 near syncopal episodes, so he wore a Holter monitor in April 2021 which was consistent with sick sinus syndrome. He had 2 prolonged pauses greater than 3.5 seconds in duration with the longest pause being 4.3 seconds. Eventually underwent implantation of a dual chamber permanent pacemaker in December 2021. Since his procedure, he denies any severe dizzy spells or recurrent syncope. Earlier this year, he was started on furosemide 20 mg daily. An echocardiogram was performed in January 2023 which demonstrated preserved LV function, EF 55- 60%, mild concentric LVH, mild to moderate aortic insufficiency with a mildly dilated aortic root. The patient was last seen in our office 6 months ago. He still splits his time between his 2 sisters 1 lives locally and one lives in the [place] area.   When he is in the [place]

## 2024-02-28 ENCOUNTER — NURSE ONLY (OUTPATIENT)
Age: 79
End: 2024-02-28

## 2024-02-28 DIAGNOSIS — Z95.0 PRESENCE OF CARDIAC PACEMAKER: Primary | ICD-10-CM

## 2024-02-28 DIAGNOSIS — I49.5 SICK SINUS SYNDROME (HCC): ICD-10-CM

## 2024-03-15 NOTE — RESULT ENCOUNTER NOTE
Device check personally reviewed by me.  Normal device function on interrogation.  Atrial fibrillation noted.  See scanned interrogation document for complete details.

## 2024-06-05 ENCOUNTER — NURSE ONLY (OUTPATIENT)
Age: 79
End: 2024-06-05
Payer: MEDICARE

## 2024-06-05 DIAGNOSIS — Z95.0 PRESENCE OF CARDIAC PACEMAKER: Primary | ICD-10-CM

## 2024-06-05 DIAGNOSIS — I49.5 SICK SINUS SYNDROME (HCC): ICD-10-CM

## 2024-06-05 PROCEDURE — 93294 REM INTERROG EVL PM/LDLS PM: CPT | Performed by: INTERNAL MEDICINE

## 2024-10-08 ENCOUNTER — OFFICE VISIT (OUTPATIENT)
Age: 79
End: 2024-10-08

## 2024-10-08 VITALS
BODY MASS INDEX: 31.49 KG/M2 | DIASTOLIC BLOOD PRESSURE: 76 MMHG | HEIGHT: 65 IN | OXYGEN SATURATION: 98 % | WEIGHT: 189 LBS | SYSTOLIC BLOOD PRESSURE: 110 MMHG | HEART RATE: 56 BPM

## 2024-10-08 DIAGNOSIS — I50.32 CHRONIC HEART FAILURE WITH PRESERVED EJECTION FRACTION (HCC): ICD-10-CM

## 2024-10-08 DIAGNOSIS — R06.09 DOE (DYSPNEA ON EXERTION): ICD-10-CM

## 2024-10-08 DIAGNOSIS — I48.0 PAROXYSMAL ATRIAL FIBRILLATION (HCC): ICD-10-CM

## 2024-10-08 DIAGNOSIS — E78.00 PURE HYPERCHOLESTEROLEMIA, UNSPECIFIED: ICD-10-CM

## 2024-10-08 DIAGNOSIS — Z95.0 PRESENCE OF CARDIAC PACEMAKER: Primary | ICD-10-CM

## 2024-10-08 DIAGNOSIS — I82.509 CHRONIC THROMBOEMBOLISM OF DEEP VEIN OF LOWER EXTREMITY, UNSPECIFIED LATERALITY (HCC): ICD-10-CM

## 2024-10-08 RX ORDER — DORZOLAMIDE HCL 20 MG/ML
1 SOLUTION/ DROPS OPHTHALMIC 2 TIMES DAILY
COMMUNITY
Start: 2024-08-08

## 2024-10-08 RX ORDER — FOLIC ACID 1 MG/1
1 TABLET ORAL DAILY
COMMUNITY

## 2024-10-08 RX ORDER — L.ACID/L.CASEI/B.BIF/B.LON/FOS 2B CELL-50
1 CAPSULE ORAL DAILY
COMMUNITY

## 2024-10-08 ASSESSMENT — ENCOUNTER SYMPTOMS
VOMITING: 0
ABDOMINAL PAIN: 0
COUGH: 0
ABDOMINAL DISTENTION: 0
NAUSEA: 0
SHORTNESS OF BREATH: 0
SORE THROAT: 0

## 2024-10-08 ASSESSMENT — ANXIETY QUESTIONNAIRES
5. BEING SO RESTLESS THAT IT IS HARD TO SIT STILL: NOT AT ALL
GAD7 TOTAL SCORE: 0
1. FEELING NERVOUS, ANXIOUS, OR ON EDGE: NOT AT ALL
3. WORRYING TOO MUCH ABOUT DIFFERENT THINGS: NOT AT ALL
2. NOT BEING ABLE TO STOP OR CONTROL WORRYING: NOT AT ALL
6. BECOMING EASILY ANNOYED OR IRRITABLE: NOT AT ALL
4. TROUBLE RELAXING: NOT AT ALL
7. FEELING AFRAID AS IF SOMETHING AWFUL MIGHT HAPPEN: NOT AT ALL

## 2024-10-08 ASSESSMENT — PATIENT HEALTH QUESTIONNAIRE - PHQ9
SUM OF ALL RESPONSES TO PHQ QUESTIONS 1-9: 0
2. FEELING DOWN, DEPRESSED OR HOPELESS: NOT AT ALL
SUM OF ALL RESPONSES TO PHQ QUESTIONS 1-9: 0
1. LITTLE INTEREST OR PLEASURE IN DOING THINGS: NOT AT ALL
SUM OF ALL RESPONSES TO PHQ QUESTIONS 1-9: 0
SUM OF ALL RESPONSES TO PHQ9 QUESTIONS 1 & 2: 0
SUM OF ALL RESPONSES TO PHQ QUESTIONS 1-9: 0

## 2024-10-08 NOTE — PROGRESS NOTES
10/08/24     Chad Palumbo  is a 79 y.o. male     Chief Complaint   Patient presents with    Follow-up     1 year    Dizziness     Dizzy at times     Edema     Bilateral leg edema on/off       HPI  Patient presents for an overdue followup office visit.   He has a past medical history significant for asymptomatic bradycardia with heart rates in the upper 30s to low 50s.  He also has a history of a previous CVA with residual right-sided deficit, history of recurrent DVTs, status post IVC filter, on chronic oral anticoagulation therapy.  He last underwent an echocardiogram in February 2011 which showed normal LV size and systolic function.  Grade 1 diastolic dysfunction, no significant valvular heart disease.  He also underwent a 24-hour Holter monitor which did not show any prolonged pauses or episodes of high-grade AV block.      Patient remains on Eliquis for oral anticoagulation.  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    The patient had 2 near syncopal episodes, so he wore a Holter monitor in April 2021 which was consistent with sick sinus syndrome.  He had 2 prolonged pauses greater than 3.5 seconds in duration with the longest pause being 4.3 seconds.    Eventually underwent implantation of a dual chamber permanent pacemaker in December 2021.  Since his procedure, he denies any severe dizzy spells or recurrent syncope.     Earlier this year, he was started on furosemide 20 mg daily.  An echocardiogram was performed in January 2023 which demonstrated preserved LV function, EF 55- 60%, mild concentric LVH, mild to moderate aortic insufficiency with a mildly dilated aortic root.    The patient was last seen in our office 14 months ago.  He has not returned to live full-time with his daughter who lives locally.  According to her, he has been having issues

## 2024-10-08 NOTE — PROGRESS NOTES
Chad Palumbo presents today for   Chief Complaint   Patient presents with    Follow-up     1 year    Dizziness     Dizzy at times     Edema     Bilateral leg edema on/off       Chadjanneth Palumbo preferred language for health care discussion is english/other.    Is someone accompanying this pt? YES    Is the patient using any DME equipment during OV? YES    Depression Screening:  Depression: Not at risk (10/8/2024)    PHQ-2     PHQ-2 Score: 0        Learning Assessment:  Who is the primary learner? Patient    What is the preferred language for health care of the primary learner? ENGLISH    How does the primary learner prefer to learn new concepts? DEMONSTRATION    Answered By patient    Relationship to Learner SELF           Pt currently taking Anticoagulant therapy? Eliquis 5 mg BID     Pt currently taking Antiplatelet therapy ? no      Coordination of Care:  1. Have you been to the ER, urgent care clinic since your last visit? Hospitalized since your last visit? no    2. Have you seen or consulted any other health care providers outside of the [facility] since your last visit? Include any pap smears or colon screening. no

## 2024-12-10 ENCOUNTER — TELEPHONE (OUTPATIENT)
Age: 79
End: 2024-12-10

## 2024-12-10 NOTE — TELEPHONE ENCOUNTER
Verbal order and read back per Fransisco Taylor MD  Please let the patient know that his nuclear stress test was normal and low risk.       This has been fully explained to the patient's daughter, who indicates understanding.

## 2024-12-10 NOTE — TELEPHONE ENCOUNTER
----- Message from Dr. Fransisco Taylor MD sent at 12/9/2024  1:13 PM EST -----  Please let the patient know that his nuclear stress test was normal and low risk.  ----- Message -----  From: Elvi Tam MA  Sent: 12/9/2024  10:47 AM EST  To: Fransisco Taylor MD    Per your last note \"  Dyspnea on exertion with decreased activity tolerance.  This is somewhat concerning for an anginal equivalent.  This may be due to worsening anemia which the patient was recently discovered to have.  I have recommended a pharmacologic nuclear stress test to evaluate for any ischemic heart disease.

## 2025-01-15 ENCOUNTER — NURSE ONLY (OUTPATIENT)
Age: 80
End: 2025-01-15

## 2025-01-15 DIAGNOSIS — Z95.0 PRESENCE OF CARDIAC PACEMAKER: Primary | ICD-10-CM

## 2025-01-15 DIAGNOSIS — I48.0 PAROXYSMAL ATRIAL FIBRILLATION (HCC): ICD-10-CM

## 2025-01-15 DIAGNOSIS — I49.5 SICK SINUS SYNDROME (HCC): ICD-10-CM

## 2025-04-11 ENCOUNTER — OFFICE VISIT (OUTPATIENT)
Age: 80
End: 2025-04-11

## 2025-04-11 VITALS
SYSTOLIC BLOOD PRESSURE: 126 MMHG | WEIGHT: 189 LBS | DIASTOLIC BLOOD PRESSURE: 82 MMHG | BODY MASS INDEX: 31.49 KG/M2 | OXYGEN SATURATION: 97 % | HEIGHT: 65 IN | HEART RATE: 72 BPM

## 2025-04-11 DIAGNOSIS — R00.1 SYMPTOMATIC BRADYCARDIA: ICD-10-CM

## 2025-04-11 DIAGNOSIS — I48.0 PAROXYSMAL ATRIAL FIBRILLATION (HCC): Primary | ICD-10-CM

## 2025-04-11 DIAGNOSIS — I82.509 CHRONIC THROMBOEMBOLISM OF DEEP VEIN OF LOWER EXTREMITY, UNSPECIFIED LATERALITY: ICD-10-CM

## 2025-04-11 DIAGNOSIS — E78.00 PURE HYPERCHOLESTEROLEMIA, UNSPECIFIED: ICD-10-CM

## 2025-04-11 DIAGNOSIS — Z95.0 PRESENCE OF CARDIAC PACEMAKER: ICD-10-CM

## 2025-04-11 DIAGNOSIS — I50.32 CHRONIC HEART FAILURE WITH PRESERVED EJECTION FRACTION (HCC): ICD-10-CM

## 2025-04-11 RX ORDER — VITAMIN B COMPLEX
1 CAPSULE ORAL DAILY
COMMUNITY

## 2025-04-11 ASSESSMENT — ANXIETY QUESTIONNAIRES
4. TROUBLE RELAXING: NOT AT ALL
1. FEELING NERVOUS, ANXIOUS, OR ON EDGE: NOT AT ALL
3. WORRYING TOO MUCH ABOUT DIFFERENT THINGS: NOT AT ALL
6. BECOMING EASILY ANNOYED OR IRRITABLE: NOT AT ALL
7. FEELING AFRAID AS IF SOMETHING AWFUL MIGHT HAPPEN: NOT AT ALL
GAD7 TOTAL SCORE: 0
2. NOT BEING ABLE TO STOP OR CONTROL WORRYING: NOT AT ALL
5. BEING SO RESTLESS THAT IT IS HARD TO SIT STILL: NOT AT ALL

## 2025-04-11 ASSESSMENT — PATIENT HEALTH QUESTIONNAIRE - PHQ9
SUM OF ALL RESPONSES TO PHQ QUESTIONS 1-9: 0
SUM OF ALL RESPONSES TO PHQ QUESTIONS 1-9: 0
1. LITTLE INTEREST OR PLEASURE IN DOING THINGS: NOT AT ALL
SUM OF ALL RESPONSES TO PHQ QUESTIONS 1-9: 0
SUM OF ALL RESPONSES TO PHQ QUESTIONS 1-9: 0
2. FEELING DOWN, DEPRESSED OR HOPELESS: NOT AT ALL

## 2025-04-11 ASSESSMENT — ENCOUNTER SYMPTOMS
VOMITING: 0
SHORTNESS OF BREATH: 0
ABDOMINAL DISTENTION: 0
SORE THROAT: 0
ABDOMINAL PAIN: 0
NAUSEA: 0
COUGH: 0

## 2025-04-11 NOTE — PROGRESS NOTES
Chad PILAR Linwood presents today for   Chief Complaint   Patient presents with    Follow-up     6 month       Chadjanneth Palumbo preferred language for health care discussion is english/other.    Is someone accompanying this pt? no    Is the patient using any DME equipment during OV? no    Depression Screening:  Depression: Not at risk (4/11/2025)    PHQ-2     PHQ-2 Score: 0        Learning Assessment:  Who is the primary learner? Patient    What is the preferred language for health care of the primary learner? ENGLISH    How does the primary learner prefer to learn new concepts? DEMONSTRATION    Answered By patient    Relationship to Learner SELF           Pt currently taking Anticoagulant therapy? Eliquis 5 mg bid    Pt currently taking Antiplatelet therapy ? no      Coordination of Care:  1. Have you been to the ER, urgent care clinic since your last visit? Hospitalized since your last visit? no    2. Have you seen or consulted any other health care providers outside of the [facility] since your last visit? Include any pap smears or colon screening. no    
needed.        Fransisco Taylor MD

## (undated) DEVICE — HI-TORQUE VERSACORE FLOPPY GUIDE WIRE SYSTEM 145 CM: Brand: HI-TORQUE VERSACORE

## (undated) DEVICE — Device

## (undated) DEVICE — SUTURE ABSORBABLE BRAIDED 2-0 CT-1 27 IN UD VICRYL J259H

## (undated) DEVICE — KENDALL RADIOLUCENT FOAM MONITORING ELECTRODE RECTANGULAR SHAPE: Brand: KENDALL

## (undated) DEVICE — DRAPE STRL ANGIO W/ 2 FLD COLLCTN PCH 86X135 218X343 CM 2

## (undated) DEVICE — 3M™ IOBAN™ 2 ANTIMICROBIAL INCISE DRAPE 6640EZ: Brand: IOBAN™ 2

## (undated) DEVICE — FORCEPS ENDOSCP BX L230CM DIA2.8MM ALGTR CUP SPEC RETRV GI

## (undated) DEVICE — REM POLYHESIVE ADULT PATIENT RETURN ELECTRODE: Brand: VALLEYLAB

## (undated) DEVICE — DRAPE SURG W25XL50IN E OPN CIR BND BG

## (undated) DEVICE — DRESSING FOAM 4X6 DISP POSTOP MEPILEX BORD AG

## (undated) DEVICE — LIMB HOLDER, WRIST/ANKLE: Brand: DEROYAL

## (undated) DEVICE — INTRO SHTH 7FR 13X20CM -- TEARAWAY

## (undated) DEVICE — PENCIL ES CRD L10FT ROCK SWCH S STL HEX LOK BLDE ELECTRD

## (undated) DEVICE — CABLE PACE ALGTR CLP SAF 12FT --

## (undated) DEVICE — SUTURE PERMA HND SZ 0 L18IN NONABSORBABLE BLK L30MM PSL REV 580H

## (undated) DEVICE — MEDI-VAC NON-CONDUCTIVE SUCTION TUBING: Brand: CARDINAL HEALTH

## (undated) DEVICE — CATH IV SAFE STR 22GX1IN BLU -- PROTECTIV PLUS

## (undated) DEVICE — MEDI-TRACE CADENCE ADULT, DEFIBRILLATION ELECTRODE -RTS  (10 PR/PK) - PHYSIO-CONTROL: Brand: MEDI-TRACE CADENCE

## (undated) DEVICE — DRESSING HEMSTAT W4XL4IN 4 PLY WHT IMPREG KAOLIN HYDRPHLC

## (undated) DEVICE — AIRLIFE™ NASAL OXYGEN CANNULA CURVED, NONFLARED TIP WITH 14 FOOT (4.3 M) CRUSH-RESISTANT TUBING, OVER-THE-EAR STYLE: Brand: AIRLIFE™

## (undated) DEVICE — MICROPUNCTURE INTRODUCER SET SILHOUETTE TRANSITIONLESS WITH STAINLESS STEEL WIRE GUIDE: Brand: MICROPUNCTURE

## (undated) DEVICE — SUTURE COAT VCRL PC 5 PRECIS COSM CONVENTIONAL CUT PRIM 3 8 J823H

## (undated) DEVICE — FLEX ADVANTAGE 1500CC: Brand: FLEX ADVANTAGE